# Patient Record
Sex: MALE | Race: WHITE | Employment: FULL TIME | URBAN - METROPOLITAN AREA
[De-identification: names, ages, dates, MRNs, and addresses within clinical notes are randomized per-mention and may not be internally consistent; named-entity substitution may affect disease eponyms.]

---

## 2023-11-13 ENCOUNTER — OFFICE VISIT (OUTPATIENT)
Dept: NEPHROLOGY | Facility: CLINIC | Age: 63
End: 2023-11-13
Payer: COMMERCIAL

## 2023-11-13 VITALS — WEIGHT: 162.25 LBS | SYSTOLIC BLOOD PRESSURE: 126 MMHG | DIASTOLIC BLOOD PRESSURE: 64 MMHG

## 2023-11-13 DIAGNOSIS — C76.0 HEAD AND NECK CANCER (HCC): ICD-10-CM

## 2023-11-13 DIAGNOSIS — I10 ESSENTIAL HYPERTENSION: ICD-10-CM

## 2023-11-13 DIAGNOSIS — N17.9 AKI (ACUTE KIDNEY INJURY) (HCC): Primary | ICD-10-CM

## 2023-11-13 RX ORDER — ATENOLOL 50 MG/1
50 TABLET ORAL DAILY
COMMUNITY

## 2023-11-13 RX ORDER — OMEPRAZOLE 20 MG/1
20 CAPSULE, DELAYED RELEASE ORAL 2 TIMES DAILY
COMMUNITY

## 2023-11-13 RX ORDER — AMLODIPINE BESYLATE 10 MG/1
10 TABLET ORAL DAILY
COMMUNITY

## 2023-11-13 NOTE — PROGRESS NOTES
Consult Note      REASON FOR CONSULT: Acute kidney injury         HPI:   Hiwot Bettencourt is a 61year old male with No chief complaint on file. No primary care provider on file. Mr. Wynona Sicard was seen in the nephrology clinic today in consultation for management of acute kidney injury based on blood work from September 11th and 14th. This is a 60-year-old man with somewhat recently diagnosed head and neck cancer who was undergoing chemotherapy beginning this past summer and was having some poor response which prompted his oncologist to increase his chemotherapy dosing. It appears that this did have a significant impact on his tumors. Reviewing his labs it is notable that his creatinine on 2 occasions in mid September (the 11th of 14) was mildly elevated at close to 1.75 mg/dL. However, since that time he had several blood draws which have shown essentially normal renal function. He has continued to lose weight and while he does have a feeding tube in he has not been adequately supplementing his nutrition with oral intake due to extreme lack of taste and flavor. ROS:    Denies fever/chills  + Wt loss  Denies HA or visual changes  Denies CP or palpitations  Denies SOB/cough/hemoptysis  Denies abd or flank pain  Denies N/V/D  Denies change in urinary habits or gross hematuria  Denies LE edema  Denies skin rashes/myalgias/arthralgias      PMH:  Hypertension  Head and neck cancer      PSH:  No past surgical history on file. Medications (Active prior to today's visit):  No current outpatient medications on file. Allergies:  Not on File    Social History:  Social History     Socioeconomic History    Marital status:           Family History:  No family history on file. PHYSICAL EXAM:   Wt 162 lb 4 oz (73.6 kg)    Wt Readings from Last 6 Encounters:   11/13/23 162 lb 4 oz (73.6 kg)     General: Alert and oriented in no apparent distress.   HEENT: No scleral icterus, MMM  Neck: Supple, no MIKE or thyromegaly  Cardiac: Regular rate and rhythm, S1, S2 normal, no murmur or rub  Lungs: Clear without wheezes, rales, rhonchi. Extremities: Without clubbing, cyanosis or edema. Neurologic:  normal affect, cranial nerves grossly intact, moving all extremities  Skin: Warm and dry, no rashes      LABS:     Labs reviewed from November 8 and include creatinine 1.03 mg/dL  ASSESSMENT/PLAN:     #1.  Acute kidney injury-Labs from September certainly demonstrate evidence of acute kidney injury with creatinine of 1.78 mg/dL at its highest.  Fortunately the renal function has improved significantly and the creatinine was most recently 1.03 mg/dL on November 8. I suspect his acute kidney injury was related to chemotherapy and may have been related to issues with prerenal azotemia from poor oral intake as well.,  Again he has essentially recovered and his renal function is now normal.  At this point he does not require specific follow-up in the nephrology clinic although I did discuss with he and his wife that I be happy to see him in the future should this be deemed necessary      #2. Hypertension-blood pressure is stable in the office today and he has been on amlodipine and atenolol for many years. He will continue his current antihypertensive regimen    #3.   Head and neck cancer-he is following closely with his oncologist            Ronald Crain MD  11/13/2023  2:34 PM

## 2024-01-20 ENCOUNTER — APPOINTMENT (OUTPATIENT)
Dept: CT IMAGING | Age: 64
DRG: 920 | End: 2024-01-20
Attending: EMERGENCY MEDICINE
Payer: COMMERCIAL

## 2024-01-20 ENCOUNTER — APPOINTMENT (OUTPATIENT)
Dept: CT IMAGING | Age: 64
End: 2024-01-20
Attending: EMERGENCY MEDICINE
Payer: COMMERCIAL

## 2024-01-20 ENCOUNTER — APPOINTMENT (OUTPATIENT)
Dept: GENERAL RADIOLOGY | Age: 64
DRG: 920 | End: 2024-01-20
Attending: PHYSICIAN ASSISTANT
Payer: COMMERCIAL

## 2024-01-20 ENCOUNTER — HOSPITAL ENCOUNTER (OUTPATIENT)
Facility: HOSPITAL | Age: 64
Setting detail: OBSERVATION
Discharge: HOME | DRG: 920 | End: 2024-01-23
Attending: EMERGENCY MEDICINE | Admitting: HOSPITALIST
Payer: COMMERCIAL

## 2024-01-20 ENCOUNTER — APPOINTMENT (OUTPATIENT)
Dept: GENERAL RADIOLOGY | Age: 64
End: 2024-01-20
Attending: PHYSICIAN ASSISTANT
Payer: COMMERCIAL

## 2024-01-20 ENCOUNTER — HOSPITAL ENCOUNTER (INPATIENT)
Facility: HOSPITAL | Age: 64
LOS: 3 days | Discharge: HOME | End: 2024-01-23
Attending: EMERGENCY MEDICINE | Admitting: HOSPITALIST
Payer: COMMERCIAL

## 2024-01-20 DIAGNOSIS — R93.89 ABNORMAL X-RAY OF NECK: ICD-10-CM

## 2024-01-20 DIAGNOSIS — E87.1 HYPONATREMIA: Primary | ICD-10-CM

## 2024-01-20 DIAGNOSIS — D64.9 ANEMIA, UNSPECIFIED TYPE: ICD-10-CM

## 2024-01-20 PROBLEM — D32.9 MENINGIOMA (HCC): Status: ACTIVE | Noted: 2024-01-20

## 2024-01-20 PROBLEM — J05.10 EPIGLOTTITIS: Status: ACTIVE | Noted: 2024-01-20

## 2024-01-20 LAB
ALBUMIN SERPL-MCNC: 3.6 G/DL (ref 3.4–5)
ALBUMIN/GLOB SERPL: 1.1 {RATIO} (ref 1–2)
ALP LIVER SERPL-CCNC: 73 U/L
ANION GAP SERPL CALC-SCNC: 4 MMOL/L (ref 0–18)
AST SERPL-CCNC: 18 U/L (ref 15–37)
BASOPHILS # BLD AUTO: 0.04 X10(3) UL (ref 0–0.2)
BASOPHILS NFR BLD AUTO: 0.8 %
BILIRUB SERPL-MCNC: 0.4 MG/DL (ref 0.1–2)
BILIRUB UR QL STRIP.AUTO: NEGATIVE
BUN BLD-MCNC: 15 MG/DL (ref 9–23)
CALCIUM BLD-MCNC: 9 MG/DL (ref 8.5–10.1)
CHLORIDE SERPL-SCNC: 86 MMOL/L (ref 98–112)
CLARITY UR REFRACT.AUTO: CLEAR
CO2 SERPL-SCNC: 30 MMOL/L (ref 21–32)
COLOR UR AUTO: YELLOW
CREAT BLD-MCNC: 0.82 MG/DL
DEPRECATED HBV CORE AB SER IA-ACNC: 297.6 NG/ML
EGFRCR SERPLBLD CKD-EPI 2021: 99 ML/MIN/1.73M2 (ref 60–?)
EOSINOPHIL # BLD AUTO: 0.23 X10(3) UL (ref 0–0.7)
EOSINOPHIL NFR BLD AUTO: 4.6 %
ERYTHROCYTE [DISTWIDTH] IN BLOOD BY AUTOMATED COUNT: 13.3 %
GLOBULIN PLAS-MCNC: 3.3 G/DL (ref 2.8–4.4)
GLUCOSE BLD-MCNC: 96 MG/DL (ref 70–99)
GLUCOSE UR STRIP.AUTO-MCNC: NEGATIVE MG/DL
HCT VFR BLD AUTO: 28.7 %
HGB BLD-MCNC: 9.7 G/DL
IMM GRANULOCYTES # BLD AUTO: 0.04 X10(3) UL (ref 0–1)
IMM GRANULOCYTES NFR BLD: 0.8 %
IRON SATN MFR SERPL: 14 %
IRON SERPL-MCNC: 47 UG/DL
KETONES UR STRIP.AUTO-MCNC: NEGATIVE MG/DL
LEUKOCYTE ESTERASE UR QL STRIP.AUTO: NEGATIVE
LYMPHOCYTES # BLD AUTO: 0.51 X10(3) UL (ref 1–4)
LYMPHOCYTES NFR BLD AUTO: 10.3 %
MCH RBC QN AUTO: 30 PG (ref 26–34)
MCHC RBC AUTO-ENTMCNC: 33.8 G/DL (ref 31–37)
MCV RBC AUTO: 88.9 FL
MONOCYTES # BLD AUTO: 0.49 X10(3) UL (ref 0.1–1)
MONOCYTES NFR BLD AUTO: 9.9 %
NEUTROPHILS # BLD AUTO: 3.66 X10 (3) UL (ref 1.5–7.7)
NEUTROPHILS # BLD AUTO: 3.66 X10(3) UL (ref 1.5–7.7)
NEUTROPHILS NFR BLD AUTO: 73.6 %
NITRITE UR QL STRIP.AUTO: NEGATIVE
OSMOLALITY SERPL CALC.SUM OF ELEC: 251 MOSM/KG (ref 275–295)
OSMOLALITY SERPL: 253 MOSM/KG (ref 280–300)
OSMOLALITY UR: 236 MOSM/KG (ref 300–1300)
OSMOLALITY UR: 253 MOSM/KG (ref 300–1300)
PH UR STRIP.AUTO: 7 [PH] (ref 5–8)
PLATELET # BLD AUTO: 241 10(3)UL (ref 150–450)
POCT INFLUENZA A: NEGATIVE
POCT INFLUENZA B: NEGATIVE
POTASSIUM SERPL-SCNC: 5 MMOL/L (ref 3.5–5.1)
PROT SERPL-MCNC: 6.9 G/DL (ref 6.4–8.2)
PROT UR STRIP.AUTO-MCNC: NEGATIVE MG/DL
RBC # BLD AUTO: 3.23 X10(6)UL
RBC UR QL AUTO: NEGATIVE
SARS-COV-2 RNA RESP QL NAA+PROBE: NOT DETECTED
SODIUM SERPL-SCNC: 120 MMOL/L (ref 136–145)
SODIUM SERPL-SCNC: 45 MMOL/L
SODIUM SERPL-SCNC: 47 MMOL/L
SP GR UR STRIP.AUTO: 1.01 (ref 1–1.03)
TIBC SERPL-MCNC: 347 UG/DL (ref 240–450)
TRANSFERRIN SERPL-MCNC: 233 MG/DL (ref 200–360)
TSI SER-ACNC: 0.82 MIU/ML (ref 0.36–3.74)
UROBILINOGEN UR STRIP.AUTO-MCNC: 0.2 MG/DL
WBC # BLD AUTO: 5 X10(3) UL (ref 4–11)

## 2024-01-20 PROCEDURE — 71046 X-RAY EXAM CHEST 2 VIEWS: CPT | Performed by: PHYSICIAN ASSISTANT

## 2024-01-20 PROCEDURE — 99223 1ST HOSP IP/OBS HIGH 75: CPT | Performed by: HOSPITALIST

## 2024-01-20 PROCEDURE — 70360 X-RAY EXAM OF NECK: CPT | Performed by: PHYSICIAN ASSISTANT

## 2024-01-20 PROCEDURE — 70491 CT SOFT TISSUE NECK W/DYE: CPT | Performed by: EMERGENCY MEDICINE

## 2024-01-20 RX ORDER — SODIUM CHLORIDE 9 MG/ML
INJECTION, SOLUTION INTRAVENOUS CONTINUOUS
Status: DISCONTINUED | OUTPATIENT
Start: 2024-01-20 | End: 2024-01-23

## 2024-01-20 RX ORDER — BENZONATATE 100 MG/1
200 CAPSULE ORAL 3 TIMES DAILY PRN
Status: DISCONTINUED | OUTPATIENT
Start: 2024-01-20 | End: 2024-01-23

## 2024-01-20 RX ORDER — AMLODIPINE BESYLATE 5 MG/1
10 TABLET ORAL DAILY
Status: DISCONTINUED | OUTPATIENT
Start: 2024-01-20 | End: 2024-01-23

## 2024-01-20 RX ORDER — PANTOPRAZOLE SODIUM 20 MG/1
20 TABLET, DELAYED RELEASE ORAL
Status: DISCONTINUED | OUTPATIENT
Start: 2024-01-20 | End: 2024-01-23

## 2024-01-20 RX ORDER — ONDANSETRON 2 MG/ML
4 INJECTION INTRAMUSCULAR; INTRAVENOUS EVERY 6 HOURS PRN
Status: DISCONTINUED | OUTPATIENT
Start: 2024-01-20 | End: 2024-01-23

## 2024-01-20 RX ORDER — PROCHLORPERAZINE EDISYLATE 5 MG/ML
5 INJECTION INTRAMUSCULAR; INTRAVENOUS EVERY 8 HOURS PRN
Status: DISCONTINUED | OUTPATIENT
Start: 2024-01-20 | End: 2024-01-23

## 2024-01-20 RX ORDER — MELATONIN
3 NIGHTLY PRN
Status: DISCONTINUED | OUTPATIENT
Start: 2024-01-20 | End: 2024-01-23

## 2024-01-20 RX ORDER — SENNOSIDES 8.6 MG
17.2 TABLET ORAL NIGHTLY PRN
Status: DISCONTINUED | OUTPATIENT
Start: 2024-01-20 | End: 2024-01-23

## 2024-01-20 RX ORDER — ATENOLOL 50 MG/1
50 TABLET ORAL DAILY
Status: DISCONTINUED | OUTPATIENT
Start: 2024-01-20 | End: 2024-01-23

## 2024-01-20 RX ORDER — ENOXAPARIN SODIUM 100 MG/ML
40 INJECTION SUBCUTANEOUS DAILY
Status: DISCONTINUED | OUTPATIENT
Start: 2024-01-20 | End: 2024-01-23

## 2024-01-20 RX ORDER — ENEMA 19; 7 G/133ML; G/133ML
1 ENEMA RECTAL ONCE AS NEEDED
Status: DISCONTINUED | OUTPATIENT
Start: 2024-01-20 | End: 2024-01-23

## 2024-01-20 RX ORDER — POLYETHYLENE GLYCOL 3350 17 G/17G
17 POWDER, FOR SOLUTION ORAL DAILY PRN
Status: DISCONTINUED | OUTPATIENT
Start: 2024-01-20 | End: 2024-01-23

## 2024-01-20 RX ORDER — ECHINACEA PURPUREA EXTRACT 125 MG
1 TABLET ORAL
Status: DISCONTINUED | OUTPATIENT
Start: 2024-01-20 | End: 2024-01-23

## 2024-01-20 RX ORDER — FOLIC ACID 1 MG/1
1 TABLET ORAL DAILY
Status: DISCONTINUED | OUTPATIENT
Start: 2024-01-20 | End: 2024-01-23

## 2024-01-20 RX ORDER — ONDANSETRON 4 MG/1
4 TABLET, ORALLY DISINTEGRATING ORAL EVERY 8 HOURS PRN
COMMUNITY

## 2024-01-20 RX ORDER — ACETAMINOPHEN 500 MG
1000 TABLET ORAL EVERY 4 HOURS PRN
Status: DISCONTINUED | OUTPATIENT
Start: 2024-01-20 | End: 2024-01-23

## 2024-01-20 RX ORDER — FOLIC ACID 1 MG/1
TABLET ORAL DAILY
COMMUNITY

## 2024-01-20 RX ORDER — SODIUM CHLORIDE 9 MG/ML
INJECTION, SOLUTION INTRAVENOUS ONCE
Status: COMPLETED | OUTPATIENT
Start: 2024-01-20 | End: 2024-01-20

## 2024-01-20 RX ORDER — BISACODYL 10 MG
10 SUPPOSITORY, RECTAL RECTAL
Status: DISCONTINUED | OUTPATIENT
Start: 2024-01-20 | End: 2024-01-23

## 2024-01-20 NOTE — CONSULTS
[facility] Consult Note    Reason for Consult: Head and Neck Cancer  Medical Record Number: [id number]   CSN: [id number]   Referring Physician: No ref. provider found  PCP: No primary care provider on file.    History of Present Illness: 63M with a PMH of RA on methotrexate, SCCa s/p chemoRT and salvage neck dissection, TRIP, GERD and HTN presented on 1/20/23 with a chronic cough that is worsening.     -He follows at [facility] for his cancer cancer.  Per notes, he was diagnosed in 05/2023. He is s/p induction cisplatin + 5-FU + Taxotere x 2 followed by chemoRT with cisplatin which was complicated by DIANNA, nausea. He s/p a salvage neck dissection on 12/11/23 with 1 mm focus of SCCa in one LN. 8 other LN negative.     -Presented to [facility] with acute on chronic cough on 1/20/23. CXR with peribronchial thickening and mild hyper-inflation. CT neck did not show recurrence. There is concern for a post-operative seroma vs. Infection. Possible meningioma in left temporal region. HE reports a history of heavy chemical exposure.      Review of Systems: 12 Point ROS was completed and pertinent positives are in the HPI    Medications:    Current Facility-Administered Medications:     amLODIPine (Norvasc) tab 10 mg, 10 mg, Oral, Daily    atenolol (Tenormin) tab 50 mg, 50 mg, Oral, Daily    folic acid (Folvite) tab 1 mg, 1 mg, Oral, Daily    pantoprazole (Protonix) DR tab 20 mg, 20 mg, Oral, BID AC    acetaminophen (Tylenol Extra Strength) tab 1,000 mg, 1,000 mg, Oral, Q4H PRN    melatonin tab 3 mg, 3 mg, Oral, Nightly PRN    polyethylene glycol (PEG 3350) (Miralax) 17 g oral packet 17 g, 17 g, Oral, Daily PRN    sennosides (Senokot) tab 17.2 mg, 17.2 mg, Oral, Nightly PRN    bisacodyl (Dulcolax) 10 MG rectal suppository 10 mg, 10 mg, Rectal, Daily PRN    fleet enema (Fleet) 7-19 GM/118ML rectal enema 133 mL, 1 enema, Rectal, Once PRN    benzonatate (Tessalon) cap 200 mg, 200 mg, Oral, TID PRN    sodium  chloride 0.9% infusion, , Intravenous, Continuous    enoxaparin (Lovenox) 40 MG/0.4ML SUBQ injection 40 mg, 40 mg, Subcutaneous, Daily    ondansetron (Zofran) 4 MG/2ML injection 4 mg, 4 mg, Intravenous, Q6H PRN    prochlorperazine (Compazine) 10 MG/2ML injection 5 mg, 5 mg, Intravenous, Q8H PRN    guaiFENesin (Robitussin) 100 MG/5 ML oral liquid 200 mg, 200 mg, Oral, Q4H PRN    sodium chloride (Saline Mist) 0.65 % nasal solution 1 spray, 1 spray, Each Nare, Q3H PRN    glycerin-hypromellose- (Artifical Tears) 0.2-0.2-1 % ophthalmic solution 1 drop, 1 drop, Both Eyes, QID PRN    piperacillin-tazobactam (Zosyn) 3.375 g in dextrose 5% 100 mL IVPB-ADDV, 3.375 g, Intravenous, Q8H    Past Medical History:   Diagnosis Date    Esophageal reflux     Essential hypertension     Throat cancer (HCC)      Past Surgical History:   Procedure Laterality Date    FOOT JOINT ARTHORCENTESIS Right     REPAIR ING HERNIA,5+Y/O,REDUCIBL      THROAT SURGERY PROCEDURE UNLISTED       Social History     Socioeconomic History    Marital status:    Tobacco Use    Smoking status: Former     Types: Cigarettes    Smokeless tobacco: Never   Vaping Use    Vaping Use: Never used   Substance and Sexual Activity    Alcohol use: Not Currently    Drug use: Never      No family history on file.    Physical Exam  /88 (BP Location: Left arm)   Pulse 69   Temp 98.1 °F (36.7 °C)   Resp 18   Ht 1.727 m (5' 8\")   Wt 69.9 kg (154 lb 1.6 oz)   SpO2 98%   BMI 23.43 kg/m²      General: NAD, AOX3  HEENT: clear op, mmm, no jvd. EOM intact, no scleral icterus   CV: RRR S1S2 no murmurs  Extremities: No edema  Lungs: CTAB, no increased work of breathing  Abd: soft nt nd +BS no hepatosplenomegaly  Neuro: CN: II-XII grossly intact  Psych: Normal Mood and affect     Results:  Lab Results   Component Value Date    WBC 5.0 01/20/2024    HGB 9.7 (L) 01/20/2024    HCT 28.7 (L) 01/20/2024    MCV 88.9 01/20/2024    .0 01/20/2024     Lab Results    Component Value Date     (LL) 01/20/2024    K 5.0 01/20/2024    CO2 30.0 01/20/2024    CL 86 (L) 01/20/2024    BUN 15 01/20/2024    ALB 3.6 01/20/2024       Radiology: reviewed     Assessment and Plan:  Squamous Cell Carcinoma of Neck: followed at [facility].  Per notes, he was diagnosed in 05/2023. He is s/p induction cisplatin + 5-FU + Taxotere x 2 followed by chemoRT with cisplatin which was complicated by DIANNA, nausea. He s/p a salvage neck dissection on 12/11/23 with 1 mm focus of SCCa in one LN. 8 other LN negative. Currently SINDHU. Outpatient PET/CT 12 weeks post-surgery is already scheduled.     Anemia: likely related to treatment, surgery and methotrexate. IV Venofer 200 mg x 3 ordered.     Hyponatremia: Nephrology following.    Possible Left Temporal meningioma: outpatient f/u with PCP for MRI.      RA: on methotrexate.     Cough: history of chemical exposure. Per primary     Oncology will sign off.     [facility]

## 2024-01-20 NOTE — PROGRESS NOTES
Nephrology Note    Pt with squamous cell CA s/p chemo + XRT + recent resection of mass / LN presents with SOB / cough and noted to be hyponatremic. Will eval urine studies / TSH; agree with IVF (NS) + fluid restriction. Will see in AM.    Ezra Monk MD  1/20/2024  543 PM

## 2024-01-20 NOTE — H&P
[facility]     Jose Angel Calderon III Patient Status:  Inpatient    3/27/1960 MRN [id number]   Location [facility] 1NE-A Attending Babar Escobra MD   Hosp Day # 0 PCP No primary care provider on file.     Chief Complaint: cough    Subjective:    History of Present Illness:     Jose Angel Calderon III is a 63 year old male with PMH squamous cell carcinoma of base of tongue s/p chemo/xrt, TRIP, GERD, HTN who p/w sob/cough. Has had chronic cough since chemo started. Completed chemo/xrt in November. Had surgery in December to remove mass and 9 lymph nodes (1 of which was cancer positive). Past week, has worsened. Now w/ SOB. Has a feeling of something in the back of his throat causing the cough. Denies f/c, cp, abd pain, n/v. Drinks about 2.5 bottles of water a day (1500cc) and does [employer] tube feed boluses 3x daily flushed w/ 250cc of free water. Has a hx of hyponatremia.     History/Other:    Past Medical History:  Past Medical History:   Diagnosis Date    Esophageal reflux     Essential hypertension     Throat cancer (HCC)      Past Surgical History:   Past Surgical History:   Procedure Laterality Date    FOOT JOINT ARTHORCENTESIS Right     REPAIR ING HERNIA,5+Y/O,REDUCIBL      THROAT SURGERY PROCEDURE UNLISTED        Family History:   No family history on file.  Social History:    reports that he has quit smoking. His smoking use included cigarettes. He has never used smokeless tobacco. He reports that he does not currently use alcohol. He reports that he does not use drugs.     Allergies: No Known Allergies    Medications:    No current facility-administered medications on file prior to encounter.     Current Outpatient Medications on File Prior to Encounter   Medication Sig Dispense Refill    Methotrexate Sodium 5 MG Oral Tab Take 0.5 tablets (2.5 mg total) by mouth 3 (three) times a week.      folic acid 1 MG Oral Tab Take by mouth daily.      ondansetron 4 MG Oral Tablet  Dispersible Take 1 tablet (4 mg total) by mouth every 8 (eight) hours as needed for Nausea.      amLODIPine 10 MG Oral Tab Take 1 tablet (10 mg total) by mouth daily.      atenolol 50 MG Oral Tab Take 1 tablet (50 mg total) by mouth daily.      omeprazole 20 MG Oral Capsule Delayed Release Take 1 capsule (20 mg total) by mouth in the morning and 1 capsule (20 mg total) before bedtime.         Review of Systems:   A comprehensive review of systems was completed.    Pertinent positives and negatives noted in the HPI.    Objective:   Physical Exam:    /88 (BP Location: Left arm)   Pulse 69   Temp 98.1 °F (36.7 °C)   Resp 18   Ht 5' 8\" (1.727 m)   Wt 154 lb 1.6 oz (69.9 kg)   SpO2 98%   BMI 23.43 kg/m²   General: No acute distress, Alert  Respiratory: No rhonchi, no wheezes  Cardiovascular: S1, S2. Regular rate and rhythm  Abdomen: Soft, Non-tender, non-distended, positive bowel sounds  Neuro: No new focal deficits  Extremities: No edema      Results:    Labs:      Labs Last 24 Hours:    Recent Labs   Lab 01/20/24  1244   RBC 3.23*   HGB 9.7*   HCT 28.7*   MCV 88.9   MCH 30.0   MCHC 33.8   RDW 13.3   NEPRELIM 3.66   WBC 5.0   .0       Recent Labs   Lab 01/20/24  1244   GLU 96   BUN 15   CREATSERUM 0.82   EGFRCR 99   CA 9.0   ALB 3.6   *   K 5.0   CL 86*   CO2 30.0   ALKPHO 73   AST 18   BILT 0.4   TP 6.9       No results found for: \"PT\", \"INR\"    No results for input(s): \"TROP\", \"TROPHS\", \"CK\" in the last 168 hours.    No results for input(s): \"TROP\", \"PBNP\" in the last 168 hours.    No results for input(s): \"PCT\" in the last 168 hours.    Imaging: Imaging data reviewed in Epic.    Assessment & Plan:      #post op seroma/fluid collection  #epiglottitis  -possible infection? Seems less likely   -ENT consult  -empiric IV abx, cxs  -IVF    #squamous cell carcinoma of base of tongue  -onc  -just completed chemo/xrt. S/p recent surgical resection of mass    #hyponatremia, suspect 2/2 excess free  water intake  -IVF  -nephro consult  -urine studies  -fluid restriction    #dietary  -consult nutrition for tube feeds    #incidental meningioma  -outpt MRI        Plan of care discussed with pt, ed physician     Babar Escobar MD    Supplementary Documentation:     The 21st Century Cures Act makes medical notes like these available to patients in the interest of transparency. Please be advised this is a medical document. Medical documents are intended to carry relevant information, facts as evident, and the clinical opinion of the practitioner. The medical note is intended as peer to peer communication and may appear blunt or direct. It is written in medical language and may contain abbreviations or verbiage that are unfamiliar.

## 2024-01-20 NOTE — ED QUICK NOTES
Orders for admission, patient is aware of plan and ready to go upstairs. Any questions, please call ED HERMINIO Person  at extension [phone].     Vaccinated? yes  Type of COVID test sent: n/a  COVID Suspicion level: Low      Titratable drug(s) infusing:n/a  Rate: NS 125cc/hr    LOC at time of transport: alert    Other pertinent information: n/a    CIWA score= n/a  NIH score=  n/a

## 2024-01-20 NOTE — ED INITIAL ASSESSMENT (HPI)
Coughing more than normal the past week- began having diff breathing over the last couple of days- recently finished chemo and radiation for throat ca

## 2024-01-20 NOTE — ED PROVIDER NOTES
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  He has a constant globulus sensation to his throat that he feels is triggering the cough.  He denies any fever or chills.  No headache or malaise.  No chest pain or shortness of breath.    Objective:   Past Medical History:   Diagnosis Date    Esophageal reflux     Essential hypertension     Throat cancer (HCC)               Past Surgical History:   Procedure Laterality Date    FOOT JOINT ARTHORCENTESIS Right     REPAIR ING HERNIA,5+Y/O,REDUCIBL      THROAT SURGERY PROCEDURE UNLISTED                  Social History     Socioeconomic History    Marital status:    Tobacco Use    Smoking status: Former     Types: Cigarettes    Smokeless tobacco: Never   Vaping Use    Vaping Use: Never used   Substance and Sexual Activity    Alcohol use: Not Currently    Drug use: Never              Review of Systems    Positive for stated complaint: SOB/cough  Other systems are as noted in HPI.  Constitutional and vital signs reviewed.      All other systems reviewed and negative except as noted above.    Physical Exam     ED Triage Vitals [01/20/24 1205]   /85   Pulse 57   Resp 18   Temp 98.1 °F (36.7 °C)   Temp src Oral   SpO2 100 %   O2 Device None (Room air)       Current:BP (!) 149/96   Pulse 62   Temp 98.1 °F (36.7 °C) (Oral)   Resp 18   Ht 172.7 cm (5' 8\")   Wt 70.3 kg   SpO2 98%   BMI 23.57 kg/m²         Physical Exam    Gen: Well appearing, well groomed, alert and aware x 3  Neck: Supple, full range of motion, no thyromegaly or lymphadenopathy.  Eye  examination: EOMs are intact, normal conjunctival  ENT: No injection noted to the bilateral auditory canals; no loss of landmarks. Normal nasal mucosa without audible nasal congestion.  Oropharynx is patent without evidence of erythema, exudates or deviation.  No stridor to auscultation  Lung: No distress, RR, no retraction, breath sounds are clear bilaterally  Cardio: Regular rate and rhythm, normal S1-S2, no murmur appreciable  Skin: No sign of trauma, Skin warm and dry, no induration or sign of infection.  No rash noted      ED Course     Labs Reviewed   COMP METABOLIC PANEL (14) - Abnormal; Notable for the following components:       Result Value    Sodium 120 (*)     Chloride 86 (*)     Calculated Osmolality 251 (*)     All other components within normal limits   CBC W/ DIFFERENTIAL - Abnormal; Notable for the following components:    RBC 3.23 (*)     HGB 9.7 (*)     HCT 28.7 (*)     Lymphocyte Absolute 0.51 (*)     All other components within normal limits   RAPID SARS-COV-2 BY PCR - Normal   POCT FLU TEST - Normal    Narrative:     This assay is a rapid molecular in vitro test utilizing nucleic acid amplification of influenza A and B viral RNA.   CBC WITH DIFFERENTIAL WITH PLATELET    Narrative:     The following orders were created for panel order CBC With Differential With Platelet.  Procedure                               Abnormality         Status                     ---------                               -----------         ------                     CBC W/ DIFFERENTIAL[[id number]]          Abnormal            Final result                 Please view results for these tests on the individual orders.   URINALYSIS, ROUTINE   OSMOLALITY, URINE   OSMOLALITY, SERUM   SODIUM, URINE, RANDOM          CT SOFT TISSUE OF NECK(CONTRAST ONLY) (CPT=70491)    Result Date: 1/20/2024  PROCEDURE:  CT SOFT TISSUE OF NECK(CONTRAST ONLY) (CPT=70491)  COMPARISON:  None.  INDICATIONS:  SOB/cough  TECHNIQUE:  IV  contrast-enhanced multislice CT scanning is performed through the neck soft tissues during administration of nonionic contrast.  Dose reduction techniques were used. Dose information is transmitted to the ACR (American College of Radiology) NRDR (National Radiology Data Registry) which includes the Dose Index Registry.  PATIENT STATED HISTORY:(As transcribed by Technologist)  Coughing more than normal the past week- began having diff breathing over the last couple of days- recently finished chemo and radiation for throat ca.    CONTRAST USED:  50cc of Isovue 370  FINDINGS: NASOPHARYNX:  Fossae of Rosenmuller and torus tubarius are symmetric.  ORAL CAVITY:  No visible mass.  OROPHARYNX:  Faucial and lingual tonsils are symmetric.  HYPOPHARYNX:  No mass or other visible lesion.  LARYNX:  The vocal cords are symmetric and without mass.  SINUSES:  Limited views show no significant fluid or mucosal thickening.  NECK GLANDS:  The parotid, submandibular, and thyroid glands are unremarkable.  LYMPH NODES:  No pathological-appearing or enlarged lymph nodes.  SKULL BASE:  Foramina are symmetric without bony erosion.  VASCULATURE:  Mild calcified plaque at the carotid bifurcations.   The thoracic aorta is partially imaged.  Tortuosity and ectasia.  3.8 cm diameter of the ascending segment.  Corresponding descending diameter 2.8 cm. BONES:  Moderate to severe degenerative changes in the cervical spine. OTHER:  There is an elongated low attenuation focus in the left neck, posterior-lateral to the carotid-jugular sheath and posterior to the sternocleido mastoid muscle.  It is measured 2.7 x 2.0 x 5.2 cm.  Hounsfield units consistent with fluid.  The inferior portion is adjacent to surgical clips.  No gas or enhancement.   There is partial imaging of an extra-axial mass in the left temporal region measuring 2.4 x 1.1 cm.   Scattered nonenlarged lymph nodes are seen in the visualized mediastinum.   There is diffuse stranding in the  neck, left greater than right.            CONCLUSION:  1. Given the history of head and neck cancer, there is no discrete mass to reflect primary neoplasm. 2. There is an elongated fluid collection in the left neck.  It is adjacent to surgical clips.  A postoperative seroma is a primary consideration.  The potential for infection should be correlated with clinical suspicion.  There are no additional imaging  findings suggesting an infected collection. 3. There is partial imaging of an extra-axial mass in the left temporal region.  This is most likely a meningioma.  A follow-up outpatient elective MRI with contrast is recommended for complete assessment. 4. Scattered nonenlarged lymph nodes in the visualized mediastinum. 5. There is diffuse stranding in the neck, left greater than right.  This may be related to the treatment as there is a given history of radiation. 6. Details as above.  Continued clinical correlation recommended.    LOCATION:  [facility]    Dictated by (CST): Ramiro Urrutia MD on 1/20/2024 at 2:12 PM     Finalized by (CST): Ramiro Urrutia MD on 1/20/2024 at 2:20 PM       XR CHEST PA + LAT CHEST (CPT=71046)    Result Date: 1/20/2024  PROCEDURE:  XR CHEST PA + LAT CHEST (CPT=71046)  INDICATIONS:  SOB/cough  COMPARISON:  None.  TECHNIQUE:  PA and lateral chest radiographs were obtained.  PATIENT STATED HISTORY: (As transcribed by Technologist)  Cough and shortness of breath for 2 weeks    FINDINGS:  Normal heart size and pulmonary vascularity. No pleural effusion or pneumothorax. No lobar consolidation.  Mildly tortuous thoracic aorta.  Degenerative changes the spine.  Surgical clips in the left neck.  Minimal atelectasis/scarring in the lower lungs.  Peribronchial thickening with mild hyperinflation could be related to bronchitis and/or asthma. Clinical correlation recommended.            CONCLUSION:  Peribronchial thickening with mild hyperinflation could be related to bronchitis and/or asthma. Clinical  correlation recommended. Minimal atelectasis/scarring in the lower lungs.   LOCATION:  [facility]   Dictated by (CST): Jai Bo MD on 1/20/2024 at 12:50 PM     Finalized by (CST): Jai Bo MD on 1/20/2024 at 12:51 PM       XR SOFT TISSUE NECK (CPT=70360)    Result Date: 1/20/2024  PROCEDURE:  XR SOFT TISSUE NECK (CPT=70360)  COMPARISON:  None.  INDICATIONS:  SOB/cough  PATIENT STATED HISTORY: (As transcribed by Technologist)  Cough and shortness of breath for 2 weeks. pt also has a history of throat ca    FINDINGS:  There is epiglottic thickening and subglottic narrowing with abnormal soft tissue opacification of the region of the larynx and hypopharynx.  Adenoid soft tissue thickening and thickening of the soft palate are also noted. The overall findings  could be due to the patient's known history of head neck cancer, with underlying infection or other etiologies not entirely excluded.  CT of the soft tissues of the neck with contrast is suggested for further evaluation.  Scattered surgical clips in the  neck.  Degenerative changes the spine.  Scattered vascular calcifications.             CONCLUSION:  There is epiglottic thickening and subglottic narrowing with abnormal soft tissue opacification of the region of the larynx and hypopharynx.  Adenoid soft tissue thickening and thickening of the soft palate are also noted. The overall findings could be due to the patient's known history of head neck cancer, with underlying infection or other etiologies not entirely excluded.  CT of the soft tissues of the neck with contrast is suggested for further evaluation.    LOCATION:  [facility]    Dictated by (CST): Jai Bo MD on 1/20/2024 at 12:48 PM     Finalized by (CST): Jai Bo MD on 1/20/2024 at 12:50 PM             MDM      Afebrile.  No tachycardia.  100% room air.  Patient speaking in room.  Feels that he has a constant worsened globulus sensation to the mouth and throat this past 7 to 10  days.    COVID swab.  Basic labs.  Chest x-ray and soft tissue neck.  Admission disposition: 1/20/2024  2:56 PM         CONCLUSION:  Peribronchial thickening with mild hyperinflation could be related to bronchitis and/or asthma. Clinical correlation recommended. Minimal atelectasis/scarring in the lower lungs.   LOCATION:  [facility]   Dictated by (CST): Jai Bo MD on 1/20/2024 at 12:50 PM     Finalized by (CST): Jai Bo MD on 1/20/2024 at 12:51 PM       CONCLUSION:  There is epiglottic thickening and subglottic narrowing with abnormal soft tissue opacification of the region of the larynx and hypopharynx.  Adenoid soft tissue thickening and thickening of the soft palate are also noted. The overall findings could be due to the patient's known history of head neck cancer, with underlying infection or other etiologies not entirely excluded.  CT of the soft tissues of the neck with contrast is suggested for further evaluation.    LOCATION:  [facility]    Dictated by (CST): Jai Bo MD on 1/20/2024 at 12:48 PM     Finalized by (CST): Jai Bo MD on 1/20/2024 at 12:50 PM        CONCLUSION:  1. Given the history of head and neck cancer, there is no discrete mass to reflect primary neoplasm. 2. There is an elongated fluid collection in the left neck.  It is adjacent to surgical clips.  A postoperative seroma is a primary consideration.  The potential for infection should be correlated with clinical suspicion.  There are no additional imaging  findings suggesting an infected collection. 3. There is partial imaging of an extra-axial mass in the left temporal region.  This is most likely a meningioma.  A follow-up outpatient elective MRI with contrast is recommended for complete assessment. 4. Scattered nonenlarged lymph nodes in the visualized mediastinum. 5. There is diffuse stranding in the neck, left greater than right.  This may be related to the treatment as there is a given history of radiation.  6. Details as above.  Continued clinical correlation recommended.    LOCATION:  [facility]    Dictated by (CST): Ramiro Urrutia MD on 1/20/2024 at 2:12 PM     Finalized by (CST): Ramiro Urrutia MD on 1/20/2024 at 2:20 PM           Imaging as above.  CT soft tissue neck with IV contrast ordered          CBC demonstrates no leukocytosis.  Hemoglobin 9.7    CMP demonstrates a sodium of 120, chloride 86.  Critical result called to nursing staff.  Labs were not completed until 1 hour 47 minutes after arrival.     Began slow fluids, 0.9@100 mL an hour    Patient sent for CT.  Will plan on admission     Case discussed with both hospitalist and nephrology, ENT    Medical Decision Making      Disposition and Plan     Clinical Impression:  1. Hyponatremia    2. Abnormal x-ray of neck         Disposition:  Admit  1/20/2024  2:56 pm    Follow-up:  No follow-up provider specified.        Medications Prescribed:  Current Discharge Medication List                            Hospital Problems       Present on Admission  Date Reviewed: 11/13/2023            ICD-10-CM Noted POA    * (Principal) Hyponatremia E87.1 1/20/2024 Unknown

## 2024-01-21 PROBLEM — E22.2 SIADH (SYNDROME OF INAPPROPRIATE ADH PRODUCTION) (HCC): Status: ACTIVE | Noted: 2024-01-21

## 2024-01-21 PROBLEM — C76.0 HEAD AND NECK CANCER (HCC): Status: ACTIVE | Noted: 2024-01-21

## 2024-01-21 LAB
ALBUMIN SERPL-MCNC: 3.4 G/DL (ref 3.4–5)
ALBUMIN/GLOB SERPL: 1.3 {RATIO} (ref 1–2)
ALP LIVER SERPL-CCNC: 61 U/L
ANION GAP SERPL CALC-SCNC: 7 MMOL/L (ref 0–18)
AST SERPL-CCNC: 7 U/L (ref 15–37)
BASOPHILS # BLD AUTO: 0.05 X10(3) UL (ref 0–0.2)
BASOPHILS NFR BLD AUTO: 1.3 %
BILIRUB SERPL-MCNC: 0.4 MG/DL (ref 0.1–2)
BUN BLD-MCNC: 10 MG/DL (ref 9–23)
C DIFF TOX B STL QL: NEGATIVE
CALCIUM BLD-MCNC: 8.7 MG/DL (ref 8.5–10.1)
CHLORIDE SERPL-SCNC: 91 MMOL/L (ref 98–112)
CO2 SERPL-SCNC: 27 MMOL/L (ref 21–32)
CREAT BLD-MCNC: 0.74 MG/DL
EGFRCR SERPLBLD CKD-EPI 2021: 102 ML/MIN/1.73M2 (ref 60–?)
EOSINOPHIL # BLD AUTO: 0.26 X10(3) UL (ref 0–0.7)
EOSINOPHIL NFR BLD AUTO: 6.7 %
ERYTHROCYTE [DISTWIDTH] IN BLOOD BY AUTOMATED COUNT: 13.2 %
GLOBULIN PLAS-MCNC: 2.7 G/DL (ref 2.8–4.4)
GLUCOSE BLD-MCNC: 138 MG/DL (ref 70–99)
GLUCOSE BLD-MCNC: 92 MG/DL (ref 70–99)
GLUCOSE BLD-MCNC: 97 MG/DL (ref 70–99)
HCT VFR BLD AUTO: 26.1 %
HGB BLD-MCNC: 9.1 G/DL
IMM GRANULOCYTES # BLD AUTO: 0.02 X10(3) UL (ref 0–1)
IMM GRANULOCYTES NFR BLD: 0.5 %
LYMPHOCYTES # BLD AUTO: 0.39 X10(3) UL (ref 1–4)
LYMPHOCYTES NFR BLD AUTO: 10.1 %
MAGNESIUM SERPL-MCNC: 1.9 MG/DL (ref 1.6–2.6)
MCH RBC QN AUTO: 30 PG (ref 26–34)
MCHC RBC AUTO-ENTMCNC: 34.9 G/DL (ref 31–37)
MCV RBC AUTO: 86.1 FL
MONOCYTES # BLD AUTO: 0.35 X10(3) UL (ref 0.1–1)
MONOCYTES NFR BLD AUTO: 9 %
NEUTROPHILS # BLD AUTO: 2.81 X10 (3) UL (ref 1.5–7.7)
NEUTROPHILS # BLD AUTO: 2.81 X10(3) UL (ref 1.5–7.7)
NEUTROPHILS NFR BLD AUTO: 72.4 %
OSMOLALITY SERPL CALC.SUM OF ELEC: 259 MOSM/KG (ref 275–295)
PHOSPHATE SERPL-MCNC: 3.3 MG/DL (ref 2.5–4.9)
PLATELET # BLD AUTO: 214 10(3)UL (ref 150–450)
POTASSIUM SERPL-SCNC: 4.3 MMOL/L (ref 3.5–5.1)
PROT SERPL-MCNC: 6.1 G/DL (ref 6.4–8.2)
RBC # BLD AUTO: 3.03 X10(6)UL
SODIUM SERPL-SCNC: 125 MMOL/L (ref 136–145)
WBC # BLD AUTO: 3.9 X10(3) UL (ref 4–11)

## 2024-01-21 PROCEDURE — 99232 SBSQ HOSP IP/OBS MODERATE 35: CPT | Performed by: HOSPITALIST

## 2024-01-21 PROCEDURE — 99255 IP/OBS CONSLTJ NEW/EST HI 80: CPT | Performed by: INTERNAL MEDICINE

## 2024-01-21 PROCEDURE — 99254 IP/OBS CNSLTJ NEW/EST MOD 60: CPT | Performed by: INTERNAL MEDICINE

## 2024-01-21 PROCEDURE — 3E0G76Z INTRODUCTION OF NUTRITIONAL SUBSTANCE INTO UPPER GI, VIA NATURAL OR ARTIFICIAL OPENING: ICD-10-PCS | Performed by: HOSPITALIST

## 2024-01-21 RX ORDER — SODIUM CHLORIDE 1 G/1
1 TABLET ORAL
Status: DISCONTINUED | OUTPATIENT
Start: 2024-01-21 | End: 2024-01-23

## 2024-01-21 RX ORDER — SODIUM BICARBONATE 325 MG/1
325 TABLET ORAL AS NEEDED
Status: DISCONTINUED | OUTPATIENT
Start: 2024-01-21 | End: 2024-01-23

## 2024-01-21 NOTE — PHYSICAL THERAPY NOTE
PHYSICAL THERAPY EVALUATION - INPATIENT     Room Number: 429/429-A  Evaluation Date: 2024  Type of Evaluation: Initial  Physician Order: PT Eval and Treat    Presenting Problem: hyponatremia  Co-Morbidities : h/o Squamous cell CA of neck  Reason for Therapy: Mobility Dysfunction and Discharge Planning      ASSESSMENT   Pt is a 63 year old male admitted on 2024 for hyponatremia. Functional outcome measures completed include AMPAC.  The AM-PAC '6-Clicks' Inpatient Basic Mobility Short Form was completed and this patient is demonstrating a Approx Degree of Impairment: 0%  degree of impairment in mobility. Research supports that patients with this level of impairment may benefit from return to home.  PT Discharge Recommendations: Home      PLAN  Patient has been evaluated and presents with no skilled Physical Therapy needs at this time.  Patient discharged from Physical Therapy services.  Please re-order if a new functional limitation presents during this admission.    GOALS  Patient was able to achieve the following goals ...    Patient was able to transfer Safely and independently   Patient able to ambulate on level surfaces Safely and independently         HOME SITUATION  Type of Home: House   Home Layout: Multi-level                Lives With: Spouse             Prior Level of [place]: Pt reports ind PTA.  Pt recently returned to work full time.      SUBJECTIVE  \"You don't want to head to the [employer] shop?\"      OBJECTIVE  Precautions: NG suction  Fall Risk: Standard fall risk    WEIGHT BEARING RESTRICTION                   PAIN ASSESSMENT  Ratin          COGNITION  Overall Cognitive Status:  WFL - within functional limits    RANGE OF MOTION AND STRENGTH ASSESSMENT  Upper extremity ROM and strength are within functional limits     Lower extremity ROM is within functional limits     Lower extremity strength is within functional limits       BALANCE  Static Sitting: Good  Dynamic Sitting:  Good  Static Standing: Good  Dynamic Standing: Good    ADDITIONAL TESTS                                    ACTIVITY TOLERANCE                         O2 WALK       NEUROLOGICAL FINDINGS                        AM-PAC '6-Clicks' INPATIENT SHORT FORM - BASIC MOBILITY  How much difficulty does the patient currently have...  Patient Difficulty: Turning over in bed (including adjusting bedclothes, sheets and blankets)?: None   Patient Difficulty: Sitting down on and standing up from a chair with arms (e.g., wheelchair, bedside commode, etc.): None   Patient Difficulty: Moving from lying on back to sitting on the side of the bed?: None   How much help from another person does the patient currently need...   Help from Another: Moving to and from a bed to a chair (including a wheelchair)?: None   Help from Another: Need to walk in hospital room?: None   Help from Another: Climbing 3-5 steps with a railing?: None       AM-PAC Score:  Raw Score: 24   Approx Degree of Impairment: 0%   Standardized Score (AM-PAC Scale): 61.14   CMS Modifier (G-Code): CH    FUNCTIONAL ABILITY STATUS  Gait Assessment   Functional Mobility/Gait Assessment  Gait Assistance: Independent  Distance (ft): 450  Assistive Device: None  Pattern: Within Functional Limits    Skilled Therapy Provided     Bed Mobility:  Rolling: ind  Supine to sit: ind   Sit to supine: ind     Transfer Mobility:  Sit to stand: ind   Stand to sit: ind  Gait = ind    Therapist's comments:Pt encouraged to continue to ambulate while in house.          Patient End of Session: Up in chair;Needs met;Call light within reach;RN aware of session/findings;All patient questions and concerns addressed;Discussed recommendations with /    Patient Evaluation Complexity Level:  History Moderate - 1 or 2 personal factors and/or co-morbidities   Examination of body systems Low - addressing 1-2 elements   Clinical Presentation Low - Stable   Clinical Decision Making Low  Complexity       PT Session Time: 10 minutes

## 2024-01-21 NOTE — PLAN OF CARE
Assumed pt care at approximately 1630. Admission navigator completed, patient oriented to room, belongings at bedside. Plan of care updated with patient and wife at bedside.

## 2024-01-21 NOTE — OCCUPATIONAL THERAPY NOTE
OT orders recieved, case discussed with evaluating PT. Pt presents with no skilled occupational therapy needs at this time. Orders completed. Please re-order if new functional limitation presents.

## 2024-01-21 NOTE — DIETARY MALNUTRITION NOTE
[facility]  NUTRITION ASSESSMENT    Pt meets moderate malnutrition criteria at this time.    CRITERIA FOR MALNUTRITION DIAGNOSIS:  Criteria for non-severe malnutrition diagnosis: chronic illness related to wt loss 10% in 6 months and muscle mass mild depletion    NUTRITION INTERVENTION:    RD nutrition Care Plan- See RD nutrition assessment for additional recommendations  Meal and Snacks - ADAT to Regular; monitor patient po intake. Encourage adequate po of appropriate diet.  Enteral Nutrition - Via G-tube, recommend bolus TF: Tavern Standard 1.4 - 2 bottles (650 ml) BID at 0800 and 1200 (or per pt preference).   This will provide 1820 kcal, 80 grams protein, 936 ml total free water, and 100% of RDI's.   Recommend 30 ml water flush before/after each bolus, TF+FWF provides 1056 ml total fluids.   Okay to continue home bolus regimen upon discharge.    PATIENT STATUS: 63 year old male admitted on 1/20 presents with SOB/cough. Pt screened d/t MST score 2. Visited pt at bedside. Pt reports decreased appetite/PO intake mostly related to tongue CA, chemo/RT, and taste changes (bland). Reports he had G-tube placed in Oct/Nov last year as he was continuing to lose wt and wasn't able to eat enough. He reports his current regimen is 4 bottles of Tavern 1.4 daily (2 in AM and 2 at lunch; provides ~1820 kcal, 80 gm pro, 936 ml free water) and then eats a PO dinner. Denies GI symptoms at this time but does report taking reglan BID because he was having some intolerance to bolus TF which has resolved since starting reglan. Reports giving bolus over ~20 min. Did discuss he could lengthen the time if needed. He does report following up with RD at [facility] who has been managing his TF regimen. Pt reports his wt last May was ~185-190 lbs and currently weighs 154 lbs. Noted pt admitted with hyponatremia. He reports drinking water throughout the day as well as giving FWF via G-tube which is likely contributing.  Nephrology following and recommending salt tabs; pt did report taking 1-2 tsp salt daily at home.    PMH: squamous cell carcinoma of base of tongue s/p chemo/xrt, TRIP, GERD, HTN     ANTHROPOMETRICS:  Ht: 172.7 cm (5' 8\")  Wt: 69.9 kg (154 lb 1.6 oz).   BMI: Body mass index is 23.43 kg/m².  IBW: 70 kg    WEIGHT HISTORY: Pt reports ~31 lb wt loss x 8 months (17%, significant).  Patient Weight(s) for the past 336 hrs:   Weight   01/20/24 1633 69.9 kg (154 lb 1.6 oz)   01/20/24 1205 70.3 kg (155 lb)       Wt Readings from Last 10 Encounters:   01/20/24 69.9 kg (154 lb 1.6 oz)   11/13/23 73.6 kg (162 lb 4 oz)        NUTRITION:  Diet:       Procedures    NPO        Percent Meals Eaten (last 3 days)       None            Food Allergies: No  Cultural/Ethnic/Christian Preferences Addressed: Yes    GI SYSTEM REVIEW:  +g-tube  Skin/Wounds: WNL    NUTRITION RELATED PHYSICAL FINDINGS:     1. Body Fat/Muscle Mass: mild muscle depletion Clavicle region, Thigh region, and Calf region     2. Fluid Accumulation: none per RN documentation     NUTRITION PRESCRIPTION: 69.9 kg  Calories: 1681-5582 calories/day (25-30 kcal/kg)  Protein:  grams protein/day (1.0-1.5 gm/kg)  Fluid: ~1 ml/kcal or per MD discretion    NUTRITION DIAGNOSIS/PROBLEM:  Malnutrition related to decreased intake as evidenced by documented/reported unintentional weight loss and loss of muscle mass    MONITOR AND EVALUATE/NUTRITION GOALS:  PO intake of 75% of meals TID - New  Weight stable within 1 to 2 lbs during admission - New  Start alternative nutrition in 24-48 hrs if diet is not able to advance- New      MEDICATIONS:  Folic acid 1 mg, venofer, protonix, abx  Gtt: NS at 100 ml/hr    LABS:  Na 120    Pt is at High nutrition risk    Hanny Grullon RD, LDN, CNSC  Clinical Dietitian  Spectra: [id number]

## 2024-01-21 NOTE — PROGRESS NOTES
[facility]     Hospitalist Progress Note     Jose Angel Calderon III Patient Status:  Inpatient    3/27/1960 MRN [id number]   Location [facility] 4NW-A Attending Babar Escobar MD   Hosp Day # 1 PCP No primary care provider on file.     Chief Complaint: seroma    Subjective:     Patient doing well, swallowing    Objective:    Review of Systems:   A comprehensive review of systems was completed; pertinent positive and negatives stated in subjective.    Vital signs:  Temp:  [97.7 °F (36.5 °C)-98.2 °F (36.8 °C)] 98.2 °F (36.8 °C)  Pulse:  [59-69] 59  Resp:  [12-19] 14  BP: (120-160)/(67-96) 120/79  SpO2:  [95 %-100 %] 96 %    Physical Exam:    General: No acute distress  ENT: open airway, bandages, no significant swelling  Respiratory: No wheezes, no rhonchi  Cardiovascular: S1, S2, regular rate and rhythm  Abdomen: Soft, Non-tender, non-distended, positive bowel sounds  Neuro: No new focal deficits.   Extremities: No edema      Diagnostic Data:    Labs:  Recent Labs   Lab 24  1244 24  0810   WBC 5.0 3.9*   HGB 9.7* 9.1*   MCV 88.9 86.1   .0 214.0       Recent Labs   Lab 24  1244 24  0810   GLU 96 92   BUN 15 10   CREATSERUM 0.82 0.74   CA 9.0 8.7   ALB 3.6 3.4   * 125*   K 5.0 4.3   CL 86* 91*   CO2 30.0 27.0   ALKPHO 73 61   AST 18 7*   BILT 0.4 0.4   TP 6.9 6.1*       Estimated Creatinine Clearance: 98.9 mL/min (based on SCr of 0.74 mg/dL).    No results for input(s): \"TROP\", \"TROPHS\", \"CK\" in the last 168 hours.    No results for input(s): \"PTP\", \"INR\" in the last 168 hours.    Lab Results   Component Value Date    TSH 0.823 2024             Microbiology    No results found for this visit on 24.      Imaging: Reviewed in Epic.    Medications:    iron sucrose  200 mg Intravenous Daily    sodium chloride  1 g Oral TID CC    amLODIPine  10 mg Oral Daily    atenolol  50 mg Oral Daily    folic acid  1 mg Oral Daily    pantoprazole  20 mg Oral BID AC    enoxaparin   40 mg Subcutaneous Daily    piperacillin-tazobactam  3.375 g Intravenous Q8H       Assessment & Plan:      #post op seroma/fluid collection  #epiglottitis  -d/w ENT in detail.   -suspect post op seroma. Doubt infection as no white count, fever, etc. Check procal. If negative and bcxs negative will stop empiric abx.   -has open airway. No significant swelling. Could consider steroids but will hold for now.  -resume diet. If tolerates well, will have pt f/u w/ his normal surgeon.      #squamous cell carcinoma of base of tongue  -onc  -just completed chemo/xrt. S/p recent surgical resection of mass     #hyponatremia, suspect 2/2 SIADH/excess free water intake  -IVF  -nephro consult  -urine studies  -fluid restriction  -Na tabs  -improving     #dietary  -consult nutrition for tube feeds     #incidental meningioma  -outpt MRI  -d/w pt in detail     Babar Escobar MD    Supplementary Documentation:     Quality:  DVT Mechanical Prophylaxis:   SCDs,    DVT Pharmacologic Prophylaxis   Medication    enoxaparin (Lovenox) 40 MG/0.4ML SUBQ injection 40 mg                Code Status: Not on file  Toscano: No urinary catheter in place  Toscano Duration (in days):   Central line:    INA:     Discharge is dependent on: course  At this point Mr. Calderon is expected to be discharge to: home    The 21st Century Cures Act makes medical notes like these available to patients in the interest of transparency. Please be advised this is a medical document. Medical documents are intended to carry relevant information, facts as evident, and the clinical opinion of the practitioner. The medical note is intended as peer to peer communication and may appear blunt or direct. It is written in medical language and may contain abbreviations or verbiage that are unfamiliar.     Dietitian Malnutrition Assessment        Evaluation for Malnutrition: Criteria for non-severe malnutrition diagnosis-         chronic illness related to   Wt loss 10% in 6 months.,  Muscle mass mild depletion.       RD Malnutrition Care Plan: See RD nutrition assessment for additional recommendations.    Body Fat/Muscle Mass:          Physician Assessment    Patient has a diagnosis of moderate malnutrition

## 2024-01-21 NOTE — PLAN OF CARE
Patient transferred from overflow unit, complains of worsening cough. Recently, patient had neck mass removed and CT shows fluid collection at the resection site. NPO since midnight, ENT to see the pt in the morning. Patient is alert and oriented, on 2L at night. IVF infusing, meds given peer MAR. No other complaints overnight. Safety precautions in place, call light within reach, plan of care ongoing.     Problem: RESPIRATORY - ADULT  Goal: Achieves optimal ventilation and oxygenation  Description: INTERVENTIONS:  - Assess for changes in respiratory status  - Assess for changes in mentation and behavior  - Position to facilitate oxygenation and minimize respiratory effort  - Oxygen supplementation based on oxygen saturation or ABGs  - Provide Smoking Cessation handout, if applicable  - Encourage broncho-pulmonary hygiene including cough, deep breathe, Incentive Spirometry  - Assess the need for suctioning and perform as needed  - Assess and instruct to report SOB or any respiratory difficulty  - Respiratory Therapy support as indicated  - Manage/alleviate anxiety  - Monitor for signs/symptoms of CO2 retention  Outcome: Progressing

## 2024-01-21 NOTE — PROGRESS NOTES
Assumed care around 1930. A/Ox4. On RA w/ sats >90. Monitor shows NSR. Continent of bowel and bladder. No reports of pain. Up SBA. Plan to cont IVF, cont abx, ENT to see, nephrology to see. Safety precautions in place, call light within reach, bed alarm on.     Order for transfer, report given to HERMINIO Maciel.

## 2024-01-21 NOTE — CONSULTS
[facility]  Report of Consultation    Jose Angel Calderon III Patient Status:  Inpatient    3/27/1960 MRN [id number]   Location [facility] 4NW-A Attending Babar Escobar MD   Hosp Day # 1 PCP No primary care provider on file.       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 TSH WNL. Meds benign without SSRI / thiazides. Denies GI losses. Euvolemic. PLAN- OK to continue usual TF / FWF; add salt tabs 1 g TID. Continue NS    2) Squamous cell CA of neck- s/p cisplatin / 5-FU / taxotere + RT -> neck dissection     3) L temporal meningioma    4) RA on methotrexate    5) Anemia      Reason for Consultation:  Hyponatremia    History of Present Illness:  Jose Angel Calderon III is a a(n) 63 year old male.  Very pleasant 63-year-old male with a history of squamous cell carcinoma of the head and neck status post chemoradiation underwent a recent neck dissection to remove residual mass as well as multiple lymph nodes 1 of which were positive.  Over the past week, he feels that there is something in the back of his throat causing a cough.  Denies any nausea vomiting diarrhea.  No changes in his medications.  He has been trying to consume more salt than usual.  He takes 4 to 5 cans of tube feeding daily with about 1 to 1.5 L of free water flushes; he also drinks water continuously due to dry mouth.  He tries to eat something solid for dinner.    History:  Past Medical History:   Diagnosis Date    Esophageal reflux     Essential hypertension     Throat cancer (HCC)      Past Surgical History:   Procedure Laterality Date    FOOT JOINT ARTHORCENTESIS Right     REPAIR ING HERNIA,5+Y/O,REDUCIBL      THROAT SURGERY PROCEDURE UNLISTED       No family history on file.  Denies family  history of kidney disease.    reports that he has quit smoking. His smoking use included cigarettes. He has never used smokeless tobacco. He reports that he does not currently use alcohol. He reports that he does not use drugs.    Allergies:  No Known Allergies    Medications:    Current Facility-Administered Medications:     iron sucrose (Venofer) 20 mg/mL injection 200 mg, 200 mg, Intravenous, Daily    amLODIPine (Norvasc) tab 10 mg, 10 mg, Oral, Daily    atenolol (Tenormin) tab 50 mg, 50 mg, Oral, Daily    folic acid (Folvite) tab 1 mg, 1 mg, Oral, Daily    pantoprazole (Protonix) DR tab 20 mg, 20 mg, Oral, BID AC    acetaminophen (Tylenol Extra Strength) tab 1,000 mg, 1,000 mg, Oral, Q4H PRN    melatonin tab 3 mg, 3 mg, Oral, Nightly PRN    polyethylene glycol (PEG 3350) (Miralax) 17 g oral packet 17 g, 17 g, Oral, Daily PRN    sennosides (Senokot) tab 17.2 mg, 17.2 mg, Oral, Nightly PRN    bisacodyl (Dulcolax) 10 MG rectal suppository 10 mg, 10 mg, Rectal, Daily PRN    fleet enema (Fleet) 7-19 GM/118ML rectal enema 133 mL, 1 enema, Rectal, Once PRN    benzonatate (Tessalon) cap 200 mg, 200 mg, Oral, TID PRN    sodium chloride 0.9% infusion, , Intravenous, Continuous    enoxaparin (Lovenox) 40 MG/0.4ML SUBQ injection 40 mg, 40 mg, Subcutaneous, Daily    ondansetron (Zofran) 4 MG/2ML injection 4 mg, 4 mg, Intravenous, Q6H PRN    prochlorperazine (Compazine) 10 MG/2ML injection 5 mg, 5 mg, Intravenous, Q8H PRN    guaiFENesin (Robitussin) 100 MG/5 ML oral liquid 200 mg, 200 mg, Oral, Q4H PRN    sodium chloride (Saline Mist) 0.65 % nasal solution 1 spray, 1 spray, Each Nare, Q3H PRN    glycerin-hypromellose- (Artifical Tears) 0.2-0.2-1 % ophthalmic solution 1 drop, 1 drop, Both Eyes, QID PRN    piperacillin-tazobactam (Zosyn) 3.375 g in dextrose 5% 100 mL IVPB-ADDV, 3.375 g, Intravenous, Q8H  No current outpatient medications on file.       Review of Systems:  Please see HPI for pertinent positives. 10 point  review of systems otherwise reviewed and negative.     Physical Exam:  /83 (BP Location: Left arm)   Pulse 60   Temp 98.1 °F (36.7 °C) (Oral)   Resp 12   Ht 5' 8\" (1.727 m)   Wt 154 lb 1.6 oz (69.9 kg)   SpO2 95%   BMI 23.43 kg/m²   Temp (24hrs), Av °F (36.7 °C), Min:97.7 °F (36.5 °C), Max:98.1 °F (36.7 °C)       Intake/Output Summary (Last 24 hours) at 2024 0850  Last data filed at 2024 0838  Gross per 24 hour   Intake --   Output 1225 ml   Net -1225 ml     Wt Readings from Last 3 Encounters:   24 154 lb 1.6 oz (69.9 kg)   23 162 lb 4 oz (73.6 kg)     General: awake alert  HEENT: No scleral icterus, MMM  Neck: Supple, no MIKE or thyromegaly  Cardiac: Regular rate and rhythm, S1, S2 normal, no murmur, rub, or gallop  Lungs: Decreased breath sounds at the bases bilaterally.   Abdomen: Soft, non-tender. + bowel sounds, no palpable organomegaly  Extremities: Without clubbing, cyanosis; no edema  Neurologic: Cranial nerves grossly intact, moving all extremities  Skin: Warm and dry, no rashes      Laboratory Data:  Lab Results   Component Value Date    WBC 3.9 2024    HGB 9.1 2024    HCT 26.1 2024    .0 2024    CREATSERUM 0.82 2024    BUN 15 2024     2024    K 5.0 2024    CL 86 2024    CO2 30.0 2024    GLU 96 2024    CA 9.0 2024    ALB 3.6 2024    ALKPHO 73 2024    BILT 0.4 2024    TP 6.9 2024    AST 18 2024    ALT  2024      Comment:      Due to  backorder we are temporarily unable to offer hospital-based ALT testing at [facility].   If urgently needed, please order ALT test code [id number].   The new order will need a new venipuncture and will be sent to [facility] for testing.   The expected turnaround time will be within 24 hours.     TSH 0.823 2024       Imaging:  All imaging studies reviewed.      Thank you for allowing me to participate in  the care of your patient.    Ezra Monk MD  1/21/2024  8:50 AM

## 2024-01-22 LAB
ANION GAP SERPL CALC-SCNC: 5 MMOL/L (ref 0–18)
BUN BLD-MCNC: 9 MG/DL (ref 9–23)
CALCIUM BLD-MCNC: 8.8 MG/DL (ref 8.5–10.1)
CHLORIDE SERPL-SCNC: 95 MMOL/L (ref 98–112)
CO2 SERPL-SCNC: 28 MMOL/L (ref 21–32)
CREAT BLD-MCNC: 0.66 MG/DL
EGFRCR SERPLBLD CKD-EPI 2021: 105 ML/MIN/1.73M2 (ref 60–?)
ERYTHROCYTE [DISTWIDTH] IN BLOOD BY AUTOMATED COUNT: 13.6 %
GLUCOSE BLD-MCNC: 109 MG/DL (ref 70–99)
GLUCOSE BLD-MCNC: 116 MG/DL (ref 70–99)
GLUCOSE BLD-MCNC: 129 MG/DL (ref 70–99)
GLUCOSE BLD-MCNC: 162 MG/DL (ref 70–99)
GLUCOSE BLD-MCNC: 95 MG/DL (ref 70–99)
HCT VFR BLD AUTO: 26.6 %
HGB BLD-MCNC: 9 G/DL
MCH RBC QN AUTO: 29.8 PG (ref 26–34)
MCHC RBC AUTO-ENTMCNC: 33.8 G/DL (ref 31–37)
MCV RBC AUTO: 88.1 FL
OSMOLALITY SERPL CALC.SUM OF ELEC: 264 MOSM/KG (ref 275–295)
PLATELET # BLD AUTO: 193 10(3)UL (ref 150–450)
POTASSIUM SERPL-SCNC: 4.2 MMOL/L (ref 3.5–5.1)
RBC # BLD AUTO: 3.02 X10(6)UL
SODIUM SERPL-SCNC: 128 MMOL/L (ref 136–145)
WBC # BLD AUTO: 3.8 X10(3) UL (ref 4–11)

## 2024-01-22 PROCEDURE — 99233 SBSQ HOSP IP/OBS HIGH 50: CPT | Performed by: INTERNAL MEDICINE

## 2024-01-22 PROCEDURE — 99232 SBSQ HOSP IP/OBS MODERATE 35: CPT | Performed by: HOSPITALIST

## 2024-01-22 RX ORDER — TOLVAPTAN 15 MG/1
15 TABLET ORAL ONCE
Status: COMPLETED | OUTPATIENT
Start: 2024-01-22 | End: 2024-01-22

## 2024-01-22 RX ORDER — METHOTREXATE 2.5 MG/1
5 TABLET ORAL WEEKLY
Status: DISCONTINUED | OUTPATIENT
Start: 2024-01-22 | End: 2024-01-22

## 2024-01-22 RX ORDER — METHOTREXATE 2.5 MG/1
15 TABLET ORAL
COMMUNITY

## 2024-01-22 NOTE — PROGRESS NOTES
[facility]     Hospitalist Progress Note     Jose Angel Calderon III Patient Status:  Inpatient    3/27/1960 MRN [id number]   Location [facility] 4NW-A Attending Babar Escobar MD   Hosp Day # 2 PCP No primary care provider on file.     Chief Complaint: seroma    Subjective:     Patient doing well, swallowing. No f/c    Objective:    Review of Systems:   A comprehensive review of systems was completed; pertinent positive and negatives stated in subjective.    Vital signs:  Temp:  [97.7 °F (36.5 °C)-98.2 °F (36.8 °C)] 98.1 °F (36.7 °C)  Pulse:  [59-68] 62  Resp:  [14-18] 18  BP: (120-151)/(72-83) 151/83  SpO2:  [95 %-100 %] 97 %    Physical Exam:    General: No acute distress  ENT: open airway, bandages, no significant swelling  Respiratory: No wheezes, no rhonchi  Cardiovascular: S1, S2, regular rate and rhythm  Abdomen: Soft, Non-tender, non-distended, positive bowel sounds  Neuro: No new focal deficits.   Extremities: No edema      Diagnostic Data:    Labs:  Recent Labs   Lab 24  1244 24  0810 24  0648   WBC 5.0 3.9* 3.8*   HGB 9.7* 9.1* 9.0*   MCV 88.9 86.1 88.1   .0 214.0 193.0       Recent Labs   Lab 24  1244 24  0810 24  0648   GLU 96 92 95   BUN 15 10 9   CREATSERUM 0.82 0.74 0.66*   CA 9.0 8.7 8.8   ALB 3.6 3.4  --    * 125* 128*   K 5.0 4.3 4.2   CL 86* 91* 95*   CO2 30.0 27.0 28.0   ALKPHO 73 61  --    AST 18 7*  --    BILT 0.4 0.4  --    TP 6.9 6.1*  --        Estimated Creatinine Clearance: 110.8 mL/min (A) (based on SCr of 0.66 mg/dL (L)).    No results for input(s): \"TROP\", \"TROPHS\", \"CK\" in the last 168 hours.    No results for input(s): \"PTP\", \"INR\" in the last 168 hours.                 Microbiology    Hospital Encounter on 24   1. Blood Culture     Status: None (Preliminary result)    Collection Time: 24  4:56 PM    Specimen: Blood,peripheral   Result Value Ref Range    Blood Culture Result No Growth 1 Day N/A         Imaging:  Reviewed in Epic.    Medications:    tolvaptan  15 mg Oral Once    iron sucrose  200 mg Intravenous Daily    sodium chloride  1 g Oral TID CC    amLODIPine  10 mg Oral Daily    atenolol  50 mg Oral Daily    folic acid  1 mg Oral Daily    pantoprazole  20 mg Oral BID AC    enoxaparin  40 mg Subcutaneous Daily    piperacillin-tazobactam  3.375 g Intravenous Q8H       Assessment & Plan:      #post op seroma/fluid collection  #epiglottitis  -d/w ENT in detail.   -suspect post op seroma. Doubt infection as no white count, fever, etc. Check procal. If negative and bcxs negative will stop empiric abx.   -has open airway. No significant swelling. Could consider steroids but will hold for now.  -will have pt f/u w/ his normal surgeon     #squamous cell carcinoma of base of tongue  -onc  -just completed chemo/xrt. S/p recent surgical resection of mass     #hyponatremia, suspect 2/2 SIADH/excess free water intake  -IVF  -nephro consult  -fluid restriction  -Na tabs  -improving but still <130  -tolvaptan today     #dietary  -consult nutrition for tube feeds     #incidental meningioma  -outpt MRI  -d/w pt in detail     POC: dc when ok w/ nephro as Na improves    Babar Escobar MD    Supplementary Documentation:     Quality:  DVT Mechanical Prophylaxis:   SCDs,    DVT Pharmacologic Prophylaxis   Medication    enoxaparin (Lovenox) 40 MG/0.4ML SUBQ injection 40 mg                Code Status: Not on file  Toscano: No urinary catheter in place  Toscano Duration (in days):   Central line:    INA:     Discharge is dependent on: course  At this point Mr. Calderon is expected to be discharge to: home    The 21st Century Cures Act makes medical notes like these available to patients in the interest of transparency. Please be advised this is a medical document. Medical documents are intended to carry relevant information, facts as evident, and the clinical opinion of the practitioner. The medical note is intended as peer to peer communication  and may appear blunt or direct. It is written in medical language and may contain abbreviations or verbiage that are unfamiliar.     Dietitian Malnutrition Assessment        Evaluation for Malnutrition: Criteria for non-severe malnutrition diagnosis-         chronic illness related to   Wt loss 10% in 6 months., Muscle mass mild depletion.       RD Malnutrition Care Plan: See RD nutrition assessment for additional recommendations.    Body Fat/Muscle Mass:          Physician Assessment    Patient has a diagnosis of moderate malnutrition

## 2024-01-22 NOTE — PLAN OF CARE
Pt received A&Ox4. Afebrile. VSS. Denies pain. Pt reported 2-3 brief episodes today where he felt it was difficult to breathe, no further complaints. Neck dressing c/d/i. ENT spoke w/ Dr. Escobar. No IP management at this time - Dr. Escobar to re-eval needs tomorrow. Pt to f/u OP w/ his surgeon. Diet resumed, 1500ml fluid restriction in place. Christina Schaffer TF boluses ordered per RD - administered via g-tube. Q6h accuchecks. 3 episodes of diarrhea, stool sample collected. Seen by Dr. Monk. Na 125. Pt started on sodium chloride tabs w/ meals. IVF infusing @ 100ml/hr. IV zosyn per MAR. Fall precautions in place. Call light in reach.

## 2024-01-22 NOTE — PLAN OF CARE
Received pt a/o x4. VSS. Afebrile. RA. Denies pain. IVF & IV abx infusing. Medication admin per MAR. Throat/ neck dressing intact. Denies dyspnea. Plan for ENT consult today. Voiding via urinal. Call light within reach. Safety precautions in place.     Problem: RESPIRATORY - ADULT  Goal: Achieves optimal ventilation and oxygenation  Description: INTERVENTIONS:  - Assess for changes in respiratory status  - Assess for changes in mentation and behavior  - Position to facilitate oxygenation and minimize respiratory effort  - Oxygen supplementation based on oxygen saturation or ABGs  - Provide Smoking Cessation handout, if applicable  - Encourage broncho-pulmonary hygiene including cough, deep breathe, Incentive Spirometry  - Assess the need for suctioning and perform as needed  - Assess and instruct to report SOB or any respiratory difficulty  - Respiratory Therapy support as indicated  - Manage/alleviate anxiety  - Monitor for signs/symptoms of CO2 retention  Outcome: Progressing

## 2024-01-22 NOTE — CONSULTS
[facility]  Report of Consultation    Jose Angel Calderon III Patient Status:  Inpatient    3/27/1960 MRN [id number]   [facility] 4NW-A Attending Babar Escobar MD   Hosp Day # 2 PCP No primary care provider on file.       No acute events  Feels well      Current Facility-Administered Medications:     iron sucrose (Venofer) 20 mg/mL injection 200 mg, 200 mg, Intravenous, Daily    sodium chloride tab 1 g, 1 g, Oral, TID CC    dextrose 10% infusion (TPN no rate), , Intravenous, Continuous PRN    pancrelipase (Lip-Prot-Amyl) (Zenpep) DR particles cap 10,000 Units, 10,000 Units, Per G Tube, PRN **AND** sodium bicarbonate tab 325 mg, 325 mg, Per G Tube, PRN    amLODIPine (Norvasc) tab 10 mg, 10 mg, Oral, Daily    atenolol (Tenormin) tab 50 mg, 50 mg, Oral, Daily    folic acid (Folvite) tab 1 mg, 1 mg, Oral, Daily    pantoprazole (Protonix) DR tab 20 mg, 20 mg, Oral, BID AC    acetaminophen (Tylenol Extra Strength) tab 1,000 mg, 1,000 mg, Oral, Q4H PRN    melatonin tab 3 mg, 3 mg, Oral, Nightly PRN    polyethylene glycol (PEG 3350) (Miralax) 17 g oral packet 17 g, 17 g, Oral, Daily PRN    sennosides (Senokot) tab 17.2 mg, 17.2 mg, Oral, Nightly PRN    bisacodyl (Dulcolax) 10 MG rectal suppository 10 mg, 10 mg, Rectal, Daily PRN    fleet enema (Fleet) 7-19 GM/118ML rectal enema 133 mL, 1 enema, Rectal, Once PRN    benzonatate (Tessalon) cap 200 mg, 200 mg, Oral, TID PRN    sodium chloride 0.9% infusion, , Intravenous, Continuous    enoxaparin (Lovenox) 40 MG/0.4ML SUBQ injection 40 mg, 40 mg, Subcutaneous, Daily    ondansetron (Zofran) 4 MG/2ML injection 4 mg, 4 mg, Intravenous, Q6H PRN    prochlorperazine (Compazine) 10 MG/2ML injection 5 mg, 5 mg, Intravenous, Q8H PRN    guaiFENesin (Robitussin) 100 MG/5 ML oral liquid 200 mg, 200 mg, Oral, Q4H PRN    sodium chloride (Saline Mist) 0.65 % nasal solution 1 spray, 1 spray, Each Nare, Q3H PRN    glycerin-hypromellose- (Artifical Tears) 0.2-0.2-1 %  ophthalmic solution 1 drop, 1 drop, Both Eyes, QID PRN    piperacillin-tazobactam (Zosyn) 3.375 g in dextrose 5% 100 mL IVPB-ADDV, 3.375 g, Intravenous, Q8H    Physical Exam:  /83   Pulse 62   Temp 98.1 °F (36.7 °C) (Temporal)   Resp 18   Ht 5' 8\" (1.727 m)   Wt 152 lb (68.9 kg)   SpO2 97%   BMI 23.11 kg/m²   Temp (24hrs), Av °F (36.7 °C), Min:97.7 °F (36.5 °C), Max:98.2 °F (36.8 °C)       Intake/Output Summary (Last 24 hours) at 2024 1141  Last data filed at 2024 0600  Gross per 24 hour   Intake 3412 ml   Output 2700 ml   Net 712 ml     Wt Readings from Last 3 Encounters:   24 152 lb (68.9 kg)   23 162 lb 4 oz (73.6 kg)     General: awake alert  HEENT: No scleral icterus, MMM  Neck: Supple, no MIKE or thyromegaly  Cardiac: Regular rate and rhythm, S1, S2 normal, no murmur, rub, or gallop  Lungs: Decreased breath sounds at the bases bilaterally.   Abdomen: Soft, non-tender. 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  Meds benign without SSRI / thiazides. Denies GI losses. Euvolemic.   Improved w/ addition of salt tabs/saline  - monitor  ; dose w/ tolvaptan x 1     2. Squamous cell CA of neck- s/p cisplatin / 5-FU / taxotere + RT -> neck dissection     3. L temporal meningioma    4. RA on methotrexate    5.  Anemia    Nilo Abbott  2024

## 2024-01-23 VITALS
HEIGHT: 68 IN | BODY MASS INDEX: 23.04 KG/M2 | OXYGEN SATURATION: 100 % | HEART RATE: 65 BPM | RESPIRATION RATE: 18 BRPM | WEIGHT: 152 LBS | TEMPERATURE: 99 F | DIASTOLIC BLOOD PRESSURE: 83 MMHG | SYSTOLIC BLOOD PRESSURE: 140 MMHG

## 2024-01-23 LAB
ANION GAP SERPL CALC-SCNC: 1 MMOL/L (ref 0–18)
BASOPHILS # BLD AUTO: 0.04 X10(3) UL (ref 0–0.2)
BASOPHILS NFR BLD AUTO: 0.9 %
BUN BLD-MCNC: 11 MG/DL (ref 9–23)
CALCIUM BLD-MCNC: 9.1 MG/DL (ref 8.5–10.1)
CHLORIDE SERPL-SCNC: 103 MMOL/L (ref 98–112)
CO2 SERPL-SCNC: 32 MMOL/L (ref 21–32)
CREAT BLD-MCNC: 1 MG/DL
EGFRCR SERPLBLD CKD-EPI 2021: 85 ML/MIN/1.73M2 (ref 60–?)
EOSINOPHIL # BLD AUTO: 0.38 X10(3) UL (ref 0–0.7)
EOSINOPHIL NFR BLD AUTO: 8.6 %
ERYTHROCYTE [DISTWIDTH] IN BLOOD BY AUTOMATED COUNT: 13.7 %
GLUCOSE BLD-MCNC: 107 MG/DL (ref 70–99)
GLUCOSE BLD-MCNC: 120 MG/DL (ref 70–99)
GLUCOSE BLD-MCNC: 123 MG/DL (ref 70–99)
HCT VFR BLD AUTO: 29.7 %
HGB BLD-MCNC: 9.7 G/DL
IMM GRANULOCYTES # BLD AUTO: 0.02 X10(3) UL (ref 0–1)
IMM GRANULOCYTES NFR BLD: 0.5 %
LYMPHOCYTES # BLD AUTO: 0.48 X10(3) UL (ref 1–4)
LYMPHOCYTES NFR BLD AUTO: 10.9 %
MCH RBC QN AUTO: 29.7 PG (ref 26–34)
MCHC RBC AUTO-ENTMCNC: 32.7 G/DL (ref 31–37)
MCV RBC AUTO: 90.8 FL
MONOCYTES # BLD AUTO: 0.56 X10(3) UL (ref 0.1–1)
MONOCYTES NFR BLD AUTO: 12.7 %
NEUTROPHILS # BLD AUTO: 2.92 X10 (3) UL (ref 1.5–7.7)
NEUTROPHILS # BLD AUTO: 2.92 X10(3) UL (ref 1.5–7.7)
NEUTROPHILS NFR BLD AUTO: 66.4 %
OSMOLALITY SERPL CALC.SUM OF ELEC: 282 MOSM/KG (ref 275–295)
PLATELET # BLD AUTO: 219 10(3)UL (ref 150–450)
POTASSIUM SERPL-SCNC: 4.3 MMOL/L (ref 3.5–5.1)
RBC # BLD AUTO: 3.27 X10(6)UL
SODIUM SERPL-SCNC: 136 MMOL/L (ref 136–145)
WBC # BLD AUTO: 4.4 X10(3) UL (ref 4–11)

## 2024-01-23 PROCEDURE — 99239 HOSP IP/OBS DSCHRG MGMT >30: CPT | Performed by: HOSPITALIST

## 2024-01-23 RX ORDER — SODIUM CHLORIDE 1 G/1
1 TABLET ORAL
Qty: 90 TABLET | Refills: 0 | Status: SHIPPED | OUTPATIENT
Start: 2024-01-23 | End: 2024-04-24

## 2024-01-23 NOTE — DISCHARGE SUMMARY
[facility]     Jose Angel Calderon III Patient Status:  Inpatient    3/27/1960 MRN [id number]   Location [facility] 4NW-A Attending No att. providers found   Hosp Day # 3 PCP No primary care provider on file.     Date of Admission: 2024  Date of Discharge:  2024     Discharge Disposition: Home or Self Care    Discharge Diagnosis:  #post op seroma/fluid collection  #epiglottitis  -d/w ENT in detail.   -suspect post op seroma. Doubt infection as no white count, fever, etc. Check procal. If negative and bcxs negative will stop empiric abx.   -has open airway. No significant swelling. Could consider steroids but will hold for now.  -will have pt f/u w/ his normal surgeon     #squamous cell carcinoma of base of tongue  -onc  -just completed chemo/xrt. S/p recent surgical resection of mass     #hyponatremia, suspect 2/2 SIADH/excess free water intake  -IVF  -nephro consult  -fluid restriction  -Na tabs  -improved  -tolvaptan      #dietary  -consult nutrition for tube feeds     #incidental meningioma  -outpt MRI  -d/w pt in detail     History of Present Illness: Jose Angel Calderon III is a 63 year old male with PMH squamous cell carcinoma of base of tongue s/p chemo/xrt, TRIP, GERD, HTN who p/w sob/cough. Has had chronic cough since chemo started. Completed chemo/xrt in November. Had surgery in December to remove mass and 9 lymph nodes (1 of which was cancer positive). Past week, has worsened. Now w/ SOB. Has a feeling of something in the back of his throat causing the cough. Denies f/c, cp, abd pain, n/v. Drinks about 2.5 bottles of water a day (1500cc) and does [employer] tube feed boluses 3x daily flushed w/ 250cc of free water. Has a hx of hyponatremia.      Brief Synopsis: pt w/ post op seroma in neck. Reviewed w/ ENT, no signs of infection. Has open airway. Can hold off on steroids and dc to f/u w/ his normal surgeon to review. 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 May be 2/2 excess free water intake and poor solute intake. Started on salt tabs. Corrected w/ tolvaptan. Ok to DC home.     Lace+ Score: 58  59-90 High Risk  29-58 Medium Risk  0-28   Low Risk       TCM Follow-Up Recommendation:  LACE 29-58: Moderate Risk of readmission after discharge from the hospital.      Procedures during hospitalization:   none    Incidental or significant findings and recommendations (brief descriptions):  none    Lab/Test results pending at Discharge:   none    Consultants:  ENT, nephro    Discharge Medication List:     Discharge Medications        START taking these medications        Instructions Prescription details   sodium chloride 1 g Tabs      Take 1 tablet (1 g total) by mouth 3 (three) times daily with meals.   Quantity: 90 tablet  Refills: 0            CONTINUE taking these medications        Instructions Prescription details   amLODIPine 10 MG Tabs  Commonly known as: Norvasc      Take 1 tablet (10 mg total) by mouth daily.   Refills: 0     atenolol 50 MG Tabs  Commonly known as: Tenormin      Take 1 tablet (50 mg total) by mouth daily.   Refills: 0     folic acid 1 MG Tabs  Commonly known as: Folvite      Take by mouth daily.   Refills: 0     methotrexate 2.5 MG Tabs  Commonly known as: Rheumatrex      Take 6 tablets (15 mg total) by mouth Every Monday.   Refills: 0     omeprazole 20 MG Cpdr  Commonly known as: PriLOSEC      Take 1 capsule (20 mg total) by mouth in the morning and 1 capsule (20 mg total) before bedtime.   Refills: 0     ondansetron 4 MG Tbdp  Commonly known as: Zofran-ODT      Take 1 tablet (4 mg total) by mouth every 8 (eight) hours as needed for Nausea.   Refills: 0               Where to Get Your Medications        These medications were sent to [id number] - [place], [place] - [address] [phone], [phone]  [address] [facility] [place] [address]      Phone: [phone]   sodium chloride 1 g  Tabs         [facility] yes    Follow-up appointment:   Ezra Monk MD  [address]  [facility] [address]  [phone]    Follow up in 1 week(s)      PCP    Schedule an appointment as soon as possible for a visit      Appointments for Next 30 Days 2024 - 2024        Date Arrival Time Visit Type Length Department Provider     2024  4:00 PM  HOSPITAL FOLLOW UP [2830] 20 min [facility] Nephrology Adriel Mendoza MD    Patient Instructions:         Location Instructions:     Masks are optional for all patients and visitors, unless otherwise indicated.                      Vital signs:  Temp:  [97.4 °F (36.3 °C)-98.7 °F (37.1 °C)] 98.7 °F (37.1 °C)  Pulse:  [64-72] 65  Resp:  [16-20] 18  BP: (123-147)/(76-88) 140/83  SpO2:  [96 %-100 %] 100 %    Physical Exam:    General: No acute distress   Lungs: clear to auscultation  Cardiovascular: S1, S2  Abdomen: Soft      -----------------------------------------------------------------------------------------------  PATIENT DISCHARGE INSTRUCTIONS: See electronic chart    Babar Escobar MD    Total time spent on discharge plannin minutes     The  [employer] Cures Act makes medical notes like these available to patients in the interest of transparency. Please be advised this is a medical document. Medical documents are intended to carry relevant information, facts as evident, and the clinical opinion of the practitioner. The medical note is intended as peer to peer communication and may appear blunt or direct. It is written in medical language and may contain abbreviations or verbiage that are unfamiliar.

## 2024-01-23 NOTE — PLAN OF CARE
Pt A/O x4. VSS. Afebrile. Pt denies pain throughout day. Medications admin per MAR. Pt ambulated safely in room. 1500cc fluid restriction. TF bolus' admin per order. Pt tolerated well. Sodium 136. Pt cleared for DC. Fall and safety precautions in place. Call light within reach.

## 2024-01-23 NOTE — PROGRESS NOTES
[facility]  Progress  Note    Jose Angel Calderon III Patient Status:  Inpatient    3/27/1960 MRN [id number]   [facility] 4NW-A Attending Babar Escobar MD   Hosp Day # 3 PCP No primary care provider on file.       No acute events         Current Facility-Administered Medications:     sodium chloride tab 1 g, 1 g, Oral, TID CC    dextrose 10% infusion (TPN no rate), , Intravenous, Continuous PRN    pancrelipase (Lip-Prot-Amyl) (Zenpep) DR particles cap 10,000 Units, 10,000 Units, Per G Tube, PRN **AND** sodium bicarbonate tab 325 mg, 325 mg, Per G Tube, PRN    amLODIPine (Norvasc) tab 10 mg, 10 mg, Oral, Daily    atenolol (Tenormin) tab 50 mg, 50 mg, Oral, Daily    folic acid (Folvite) tab 1 mg, 1 mg, Oral, Daily    pantoprazole (Protonix) DR tab 20 mg, 20 mg, Oral, BID AC    acetaminophen (Tylenol Extra Strength) tab 1,000 mg, 1,000 mg, Oral, Q4H PRN    melatonin tab 3 mg, 3 mg, Oral, Nightly PRN    polyethylene glycol (PEG 3350) (Miralax) 17 g oral packet 17 g, 17 g, Oral, Daily PRN    sennosides (Senokot) tab 17.2 mg, 17.2 mg, Oral, Nightly PRN    bisacodyl (Dulcolax) 10 MG rectal suppository 10 mg, 10 mg, Rectal, Daily PRN    fleet enema (Fleet) 7-19 GM/118ML rectal enema 133 mL, 1 enema, Rectal, Once PRN    benzonatate (Tessalon) cap 200 mg, 200 mg, Oral, TID PRN    sodium chloride 0.9% infusion, , Intravenous, Continuous    enoxaparin (Lovenox) 40 MG/0.4ML SUBQ injection 40 mg, 40 mg, Subcutaneous, Daily    ondansetron (Zofran) 4 MG/2ML injection 4 mg, 4 mg, Intravenous, Q6H PRN    prochlorperazine (Compazine) 10 MG/2ML injection 5 mg, 5 mg, Intravenous, Q8H PRN    guaiFENesin (Robitussin) 100 MG/5 ML oral liquid 200 mg, 200 mg, Oral, Q4H PRN    sodium chloride (Saline Mist) 0.65 % nasal solution 1 spray, 1 spray, Each Nare, Q3H PRN    glycerin-hypromellose- (Artifical Tears) 0.2-0.2-1 % ophthalmic solution 1 drop, 1 drop, Both Eyes, QID PRN    piperacillin-tazobactam (Zosyn) 3.375 g  in dextrose 5% 100 mL IVPB-ADDV, 3.375 g, Intravenous, Q8H    Physical Exam:  /80 (BP Location: Left arm)   Pulse 64   Temp 98.5 °F (36.9 °C) (Oral)   Resp 18   Ht 5' 8\" (1.727 m)   Wt 152 lb (68.9 kg)   SpO2 98%   BMI 23.11 kg/m²   Temp (24hrs), Av.8 °F (36.6 °C), Min:97.4 °F (36.3 °C), Max:98.5 °F (36.9 °C)       Intake/Output Summary (Last 24 hours) at 2024 1109  Last data filed at 2024 0518  Gross per 24 hour   Intake 3241 ml   Output 3600 ml   Net -359 ml     Wt Readings from Last 3 Encounters:   24 152 lb (68.9 kg)   23 162 lb 4 oz (73.6 kg)     General: awake alert  HEENT: No scleral icterus, MMM  Neck: Supple, no MIKE or thyromegaly  Cardiac: Regular rate and rhythm, S1, S2 normal, no murmur, rub, or gallop  Lungs: Decreased breath sounds at the bases bilaterally.   Abdomen: Soft, non-tender. + bowel sounds, no palpable organomegaly  Extremities: Without clubbing, cyanosis; no edema  Neurologic: Cranial nerves grossly intact, moving all extremities  Skin: Warm and dry, no rashes.    Recent Labs     24  1244 24  0810 24  0648 24  0644   WBC 5.0 3.9* 3.8* 4.4   HGB 9.7* 9.1* 9.0* 9.7*   MCV 88.9 86.1 88.1 90.8   .0 214.0 193.0 219.0       Recent Labs     24  1244 24  0810 24  0648 24  0644   * 125* 128* 136   K 5.0 4.3 4.2 4.3   CL 86* 91* 95* 103   CO2 30.0 27.0 28.0 32.0   BUN 15 10 9 11   CREATSERUM 0.82 0.74 0.66* 1.00   CA 9.0 8.7 8.8 9.1   MG  --  1.9  --   --    PHOS  --  3.3  --   --        Recent Labs     24  1244 24  0810   AST 18 7*   ALB 3.6 3.4   ;      Assessment / Plan:    1.Hyponatremia- acute on chronic; in part dilutional due to TF (4-5 cans/day) + FWF (> 1 L daily) + drinking / sipping water continuously; 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  Meds benign without SSRI / thiazides. Denies GI losses. Euvolemic.   Improved w/ addition of  salt tabs/saline + dose of tolvaptan 1/22      2. Squamous cell CA of neck- s/p cisplatin / 5-FU / taxotere + RT -> neck dissection 12/23    3. L temporal meningioma    4. RA on methotrexate    5.  Anemia    DC planning; follow up in clinic in 1-2 wk    Nilo Abbott  1/23/2024        18

## 2024-01-23 NOTE — PROGRESS NOTES
NURSING DISCHARGE NOTE    Discharged Home via Ambulatory.  Accompanied by Family member  Belongings Taken by patient/family.    Pt discharged home via car w/ family member at approx. 1400. AVS discussed w/ pt. All belongings sent w/ pt.

## 2024-01-23 NOTE — PLAN OF CARE
Pt A/O x4. VSS. Afebrile. Pt denied pain throughout day. Medications admin per MAR. Pt's TF bolus' admin per order. Pt tolerated well. Pt ambulated safely in room. Pt had concern regarding methotrexate medication, MD notified of concern. Pharmacy consulted for medication order. Per MD, to hold medication and contact pt's pharmacy. Pt aware. Pharmacy closed tonight, to be addressed tomorrow. [facility] aware. Fall and safety precautions in place. Call light within reach.

## 2024-01-23 NOTE — PAYOR COMM NOTE
--------------  ADMISSION REVIEW     Payor: [employer]  Subscriber #:  [id number]  Authorization Number: [id number]    Admit date: 1/20/24  Admit time:  4:21 PM       History   HPI  63 year old male with PMH of squamous cell cancer of base of tongue with lymph node involvement 5/2023 and s/p chemo/radiation, TRIP, GERD, HTN   While the patient has had some level of chronic cough since his chemotherapy, this past 7 to 10 days he feels this is worsened.  He has a constant globulus sensation to his throat that he feels is triggering the cough.  He denies any fever or chills.  No headache or malaise.  No chest pain or shortness of breath.  ED Triage Vitals [01/20/24 1205]   /85   Pulse 57   Resp 18   Temp 98.1 °F (36.7 °C)   Temp src Oral   SpO2 100 %   O2 Device None (Room air)     Gen: Well appearing, well groomed, alert and aware x 3  Neck: Supple, full range of motion, no thyromegaly or lymphadenopathy.  Eye examination: EOMs are intact, normal conjunctival  ENT: No injection noted to the bilateral auditory canals; no loss of landmarks. Normal nasal mucosa without audible nasal congestion.  Oropharynx is patent without evidence of erythema, exudates or deviation.  No stridor to auscultation  Lung: No distress, RR, no retraction, breath sounds are clear bilaterally  Cardio: Regular rate and rhythm, normal S1-S2, no murmur appreciable  Skin: No sign of trauma, Skin warm and dry, no induration or sign of infection.  No rash noted    Labs Reviewed   COMP METABOLIC PANEL (14) - Abnormal; Notable for the following components:       Result Value    Sodium 120 (*)     Chloride 86 (*)     Calculated Osmolality 251 (*)    CBC W/ DIFFERENTIAL - Abnormal; Notable for the following components:    RBC 3.23 (*)     HGB 9.7 (*)     HCT 28.7 (*)     Lymphocyte Absolute 0.51 (*)    CT SOFT TISSUE OF NECK(CONTRAST ONLY) (CPT=70491)  Result Date: 1/20/2024  CONCLUSION:  1. Given the history of head and neck  cancer, there is no discrete mass to reflect primary neoplasm. 2. There is an elongated fluid collection in the left neck.  It is adjacent to surgical clips.  A postoperative seroma is a primary consideration.  The potential for infection should be correlated with clinical suspicion.  There are no additional imaging  findings suggesting an infected collection. 3. There is partial imaging of an extra-axial mass in the left temporal region.  This is most likely a meningioma.  A follow-up outpatient elective MRI with contrast is recommended for complete assessment. 4. Scattered nonenlarged lymph nodes in the visualized mediastinum. 5. There is diffuse stranding in the neck, left greater than right.  This may be related to the treatment as there is a given history of radiation. 6. Details as above.  Continued clinical correlation recommended.    LOCATION:  [facility]    Dictated by (CST): Ramiro Urrutia MD on 1/20/2024 at 2:12 PM     Finalized by (CST): Ramiro Urrutia MD on 1/20/2024 at 2:20 PM       XR CHEST PA + LAT CHEST (CPT=71046)  Result Date: 1/20/2024  CONCLUSION:  Peribronchial thickening with mild hyperinflation could be related to bronchitis and/or asthma. Clinical correlation recommended. Minimal atelectasis/scarring in the lower lungs.   LOCATION:  [facility]   Dictated by (CST): Jai Bo MD on 1/20/2024 at 12:50 PM     Finalized by (CST): Jai Bo MD on 1/20/2024 at 12:51 PM       XR SOFT TISSUE NECK (CPT=70360)  Result Date: 1/20/2024  CONCLUSION:  There is epiglottic thickening and subglottic narrowing with abnormal soft tissue opacification of the region of the larynx and hypopharynx.  Adenoid soft tissue thickening and thickening of the soft palate are also noted. The overall findings could be due to the patient's known history of head neck cancer, with underlying infection or other etiologies not entirely excluded.  CT of the soft tissues of the neck with contrast is suggested for further evaluation.     LOCATION:  [facility]    Dictated by (CST): Jai Bo MD on 1/20/2024 at 12:48 PM     Finalized by (CST): Jai Bo MD on 1/20/2024 at 12:50 PM        Disposition and Plan   Clinical Impression:  1. Hyponatremia    2. Abnormal x-ray of neck       Disposition:  Admit  1/20/2024  2:56 pm          History and Physical   History of Present Illness:   Jose Angel Calderon III is a 63 year old male with PMH squamous cell carcinoma of base of tongue s/p chemo/xrt, TRIP, GERD, HTN who p/w sob/cough. Has had chronic cough since chemo started. Completed chemo/xrt in November. Had surgery in December to remove mass and 9 lymph nodes (1 of which was cancer positive). Past week, has worsened. Now w/ SOB. Has a feeling of something in the back of his throat causing the cough. Denies f/c, cp, abd pain, n/v. Drinks about 2.5 bottles of water a day (1500cc) and does [employer] tube feed boluses 3x daily flushed w/ 250cc of free water. Has a hx of hyponatremia.      /88 (BP Location: Left arm)   Pulse 69   Temp 98.1 °F (36.7 °C)   Resp 18   Ht 5' 8\" (1.727 m)   Wt 154 lb 1.6 oz (69.9 kg)   SpO2 98%   BMI 23.43 kg/m²   General:  Alert  Respiratory: No rhonchi, no wheezes  Cardiovascular: S1, S2. Regular rate and rhythm  Abdomen: Soft, Non-tender, non-distended, positive bowel sounds  Neuro: No new focal deficits  Extremities: No edema  Lab 01/20/24  1244   RBC 3.23*   HGB 9.7*   HCT 28.7*   MCV 88.9   MCH 30.0   MCHC 33.8   RDW 13.3   NEPRELIM 3.66   WBC 5.0   .0     Lab 01/20/24  1244   GLU 96   BUN 15   CREATSERUM 0.82   EGFRCR 99   CA 9.0   ALB 3.6   *   K 5.0   CL 86*   CO2 30.0   ALKPHO 73   AST 18   BILT 0.4   TP 6.9     Assessment & Plan:    #post op seroma/fluid collection  #epiglottitis  -possible infection? Seems less likely   -ENT consult  -empiric IV abx, cxs  -IVF    #squamous cell carcinoma of base of tongue  -onc  -just completed chemo/xrt. S/p recent surgical resection of  mass    #hyponatremia, suspect 2/2 excess free water intake  -IVF  -nephro consult  -urine studies  -fluid restriction    #dietary  -consult nutrition for tube feeds    #incidental meningioma  -outpt MRI      1/21/24  Temp:  [97.7 °F (36.5 °C)-98.2 °F (36.8 °C)] 98.2 °F (36.8 °C)  Pulse:  [59-69] 59  Resp:  [12-19] 14  BP: (120-160)/(67-96) 120/79  SpO2:  [95 %-100 %] 96 %  Lab 01/20/24  1244 01/21/24  0810   WBC 5.0 3.9*   HGB 9.7* 9.1*   MCV 88.9 86.1   .0 214.0     Lab 01/20/24  1244 01/21/24  0810   GLU 96 92   BUN 15 10   CREATSERUM 0.82 0.74   CA 9.0 8.7   ALB 3.6 3.4   * 125*   K 5.0 4.3   CL 86* 91*   CO2 30.0 27.0   ALKPHO 73 61   AST 18 7*   BILT 0.4 0.4   TP 6.9 6.1*    Medications:    iron sucrose  200 mg Intravenous Daily    sodium chloride  1 g Oral TID CC    amLODIPine  10 mg Oral Daily    atenolol  50 mg Oral Daily    folic acid  1 mg Oral Daily    pantoprazole  20 mg Oral BID AC    enoxaparin  40 mg Subcutaneous Daily    piperacillin-tazobactam  3.375 g Intravenous Q8H         Assessment & Plan:       #post op seroma/fluid collection  #epiglottitis  -d/w ENT in detail.   -suspect post op seroma. Doubt infection as no white count, fever, etc. Check procal. If negative and bcxs negative will stop empiric abx.   -has open airway. No significant swelling. Could consider steroids but will hold for now.  -resume diet. If tolerates well, will have pt f/u w/ his normal surgeon.      #squamous cell carcinoma of base of tongue  -onc  -just completed chemo/xrt. S/p recent surgical resection of mass     #hyponatremia, suspect 2/2 SIADH/excess free water intake  -IVF  -nephro consult  -urine studies  -fluid restriction  -Na tabs  -improving     #dietary  -consult nutrition for tube feeds     #incidental meningioma  -outpt MRI      1/22/24  Temp:  [97.7 °F (36.5 °C)-98.2 °F (36.8 °C)] 98.1 °F (36.7 °C)  Pulse:  [59-68] 62  Resp:  [14-18] 18  BP: (120-151)/(72-83) 151/83  SpO2:  [95 %-100 %] 97 %      Physical Exam:    ENT: open airway, bandages, no significant swelling  Respiratory: No wheezes, no rhonchi  Cardiovascular: S1, S2, regular rate and rhythm  Abdomen: Soft, Non-tender, non-distended, positive bowel sounds  Neuro: No new focal deficits.   Extremities: No edema  Lab 01/20/24  1244 01/21/24  0810 01/22/24  0648   WBC 5.0 3.9* 3.8*   HGB 9.7* 9.1* 9.0*   MCV 88.9 86.1 88.1   .0 214.0 193.0      Lab 01/20/24  1244 01/21/24  0810 01/22/24  0648   GLU 96 92 95   BUN 15 10 9   CREATSERUM 0.82 0.74 0.66*   CA 9.0 8.7 8.8   ALB 3.6 3.4  --    * 125* 128*   K 5.0 4.3 4.2   CL 86* 91* 95*   CO2 30.0 27.0 28.0   ALKPHO 73 61  --    AST 18 7*  --    BILT 0.4 0.4  --    TP 6.9 6.1*  --    Assessment & Plan:       #post op seroma/fluid collection  #epiglottitis  -d/w ENT in detail.   -suspect post op seroma. Doubt infection as no white count, fever, etc. Check procal. If negative and bcxs negative will stop empiric abx.   -has open airway. No significant swelling. Could consider steroids but will hold for now.  -will have pt f/u w/ his normal surgeon     #squamous cell carcinoma of base of tongue  -onc  -just completed chemo/xrt. S/p recent surgical resection of mass     #hyponatremia, suspect 2/2 SIADH/excess free water intake  -IVF  -nephro consult  -fluid restriction  -Na tabs  -improving but still <130  -tolvaptan today     #dietary  -consult nutrition for tube feeds     #incidental meningioma  -outpt MRI         MEDICATIONS ADMINISTERED IN LAST 1 DAY:  amLODIPine (Norvasc) tab 10 mg       Date Action Dose Route User    1/22/2024 0843 Given 10 mg Oral Justus Reilly, RN          atenolol (Tenormin) tab 50 mg       Date Action Dose Route User    1/22/2024 0846 Given 50 mg Oral Justus Reilly, RN          enoxaparin (Lovenox) 40 MG/0.4ML SUBQ injection 40 mg       Date Action Dose Route User    1/22/2024 1846 Given 40 mg Subcutaneous (Right Upper Arm) Justus Reilly, RN          folic acid  (Folvite) tab 1 mg       Date Action Dose Route User    1/22/2024 0846 Given 1 mg Oral Justus Reilly RN          iron sucrose (Venofer) 20 mg/mL injection 200 mg       Date Action Dose Route User    1/22/2024 0846 New Bag 200 mg Intravenous Justus Reilly RN          pantoprazole (Protonix) DR tab 20 mg       Date Action Dose Route User    1/23/2024 0516 Given 20 mg Oral Gilma Yoder RN    1/22/2024 1846 Given 20 mg Oral Justus Reilly RN          piperacillin-tazobactam (Zosyn) 3.375 g in dextrose 5% 100 mL IVPB-ADDV       Date Action Dose Route User    1/23/2024 0132 New Bag 3.375 g Intravenous Gilma Yoder RN    1/22/2024 1846 New Bag 3.375 g Intravenous Justus Reilly RN    1/22/2024 0901 New Bag 3.375 g Intravenous Justus Reilly RN          sodium chloride 0.9% infusion       Date Action Dose Route User    1/22/2024 1144 Rate/Dose Change (none) Intravenous Justus Reilly RN          sodium chloride tab 1 g       Date Action Dose Route User    1/22/2024 1846 Given 1 g Oral Justus Reilly RN    1/22/2024 1333 Given 1 g Oral Radha David RN    1/22/2024 0900 Given 1 g Oral Justus Reilly RN          tolvaptan (Samsca) tab 15 mg       Date Action Dose Route User    1/22/2024 1333 Given 15 mg Oral Radha David RN            Vitals (last day)       Date/Time Temp Pulse Resp BP SpO2 Weight O2 Device O2 Flow Rate (L/min) [facility] [place]    01/23/24 0518 97.6 °F (36.4 °C) 68 16 143/76 97 % -- None (Room air) -- [place]    01/23/24 0000 97.4 °F (36.3 °C) 72 18 123/88 97 % -- None (Room air) -- [place]    01/22/24 2000 97.6 °F (36.4 °C) 68 20 130/80 96 % -- None (Room air) -- [place]    01/22/24 1556 97.8 °F (36.6 °C) 64 20 132/72 99 % -- None (Room air) -- [place]    01/22/24 0330 97.9 °F (36.6 °C) 67 18 137/83 98 % -- None (Room air) -- [place]

## 2024-01-24 ENCOUNTER — PATIENT OUTREACH (OUTPATIENT)
Dept: CASE MANAGEMENT | Age: 64
End: 2024-01-24

## 2024-01-24 NOTE — PAYOR COMM NOTE
--------------  DISCHARGE REVIEW    Payor: [facility] OUT OF STATE PPO  Subscriber #:  [id number]  Authorization Number: [id number]    Admit date: 24  Admit time:   4:21 PM  Discharge Date: 2024  2:15 PM     Admitting Physician: Babar Escobar MD  Attending Physician:  No att. providers found  Primary Care Physician: Morgan Woods          Discharge Summary Notes        Discharge Summary signed by Babar Escobar MD at 2024  5:18 PM       Author: Babar Escobar MD Specialty: HOSPITALIST, Internal Medicine Author Type: Physician    Filed: 2024  5:18 PM Date of Service: 2024  5:14 PM Status: Signed    : Babar Escobar MD (Physician)           [facility]  DISCHARGE SUMMARY     Jose Angel Calderon III Patient Status:  Inpatient    3/27/1960 MRN [id number]   Location [facility] 4NW-A Attending No att. providers found   Hosp Day # 3 PCP No primary care provider on file.     Date of Admission: 2024  Date of Discharge:  2024     Discharge Disposition: Home or Self Care    Discharge Diagnosis:  #post op seroma/fluid collection  #epiglottitis  -d/w ENT in detail.   -suspect post op seroma. Doubt infection as no white count, fever, etc. Check procal. If negative and bcxs negative will stop empiric abx.   -has open airway. No significant swelling. Could consider steroids but will hold for now.  -will have pt f/u w/ his normal surgeon     #squamous cell carcinoma of base of tongue  -onc  -just completed chemo/xrt. S/p recent surgical resection of mass     #hyponatremia, suspect 2/2 SIADH/excess free water intake  -IVF  -nephro consult  -fluid restriction  -Na tabs  -improved  -tolvaptan      #dietary  -consult nutrition for tube feeds     #incidental meningioma  -outpt MRI  -d/w pt in detail     History of Present Illness: Jose Angel Calderon III is a 63 year old male with PMH squamous cell carcinoma of base of tongue s/p chemo/xrt, TRIP, GERD, HTN who p/w  sob/cough. Has had chronic cough since chemo started. Completed chemo/xrt in November. Had surgery in December to remove mass and 9 lymph nodes (1 of which was cancer positive). Past week, has worsened. Now w/ SOB. Has a feeling of something in the back of his throat causing the cough. Denies f/c, cp, abd pain, n/v. Drinks about 2.5 bottles of water a day (1500cc) and does [employer] tube feed boluses 3x daily flushed w/ 250cc of free water. Has a hx of hyponatremia.      Brief Synopsis: pt w/ post op seroma in neck. Reviewed w/ ENT, no signs of infection. Has open airway. Can hold off on steroids and dc to f/u w/ his normal surgeon to review. Also noted to have hyponatremia/SIADH. May be paraneoplastic although cancer was removed. May be 2/2 excess free water intake and poor solute intake. Started on salt tabs. Corrected w/ tolvaptan. Ok to DC home.     Lace+ Score: 58  59-90 High Risk  29-58 Medium Risk  0-28   Low Risk       TCM Follow-Up Recommendation:  LACE 29-58: Moderate Risk of readmission after discharge from the hospital.      Procedures during hospitalization:   none    Incidental or significant findings and recommendations (brief descriptions):  none    Lab/Test results pending at Discharge:   none    Consultants:  ENT, nephro    Discharge Medication List:     Discharge Medications        START taking these medications        Instructions Prescription details   sodium chloride 1 g Tabs      Take 1 tablet (1 g total) by mouth 3 (three) times daily with meals.   Quantity: 90 tablet  Refills: 0            CONTINUE taking these medications        Instructions Prescription details   amLODIPine 10 MG Tabs  Commonly known as: Norvasc      Take 1 tablet (10 mg total) by mouth daily.   Refills: 0     atenolol 50 MG Tabs  Commonly known as: Tenormin      Take 1 tablet (50 mg total) by mouth daily.   Refills: 0     folic acid 1 MG Tabs  Commonly known as: Folvite      Take by mouth daily.   Refills: 0      methotrexate 2.5 MG Tabs  Commonly known as: Rheumatrex      Take 6 tablets (15 mg total) by mouth Every Monday.   Refills: 0     omeprazole 20 MG Cpdr  Commonly known as: PriLOSEC      Take 1 capsule (20 mg total) by mouth in the morning and 1 capsule (20 mg total) before bedtime.   Refills: 0     ondansetron 4 MG Tbdp  Commonly known as: Zofran-ODT      Take 1 tablet (4 mg total) by mouth every 8 (eight) hours as needed for Nausea.   Refills: 0               Where to Get Your Medications        These medications were sent to [id number] - [place], [place] - [address] [facility] [phone], [phone]  [address] [facility] [facility] [address]      Phone: [phone]   sodium chloride 1 g Tabs         [place] reviewed[place]    Follow-up appointment:   Ezra Monk MD  [address] [place] [address]  [address] [address] [phone]    Follow up in 1 week(s)      PCP    Schedule an appointment as soon as possible for a visit      Appointments for Next 30 Days 2024 - 2024        Date Arrival Time Visit Type Length Department Provider     2024  4:00 PM  HOSPITAL FOLLOW UP [2830] 20 min [facility] Nephrology Adriel Mendoza MD    Patient Instructions:         Location Instructions:     Masks are optional for all patients and visitors, unless otherwise indicated.                      Vital signs:  Temp:  [97.4 °F (36.3 °C)-98.7 °F (37.1 °C)] 98.7 °F (37.1 °C)  Pulse:  [64-72] 65  Resp:  [16-20] 18  BP: (123-147)/(76-88) 140/83  SpO2:  [96 %-100 %] 100 %    Physical Exam:    General: No acute distress   Lungs: clear to auscultation  Cardiovascular: S1, S2  Abdomen: Soft      -----------------------------------------------------------------------------------------------  PATIENT DISCHARGE INSTRUCTIONS: See electronic chart    Babar Escobar MD    Total time spent on discharge plannin minutes     The  Cures Act makes medical notes like these available to patients in the interest  of transparency. Please be advised this is a medical document. Medical documents are intended to carry relevant information, facts as evident, and the clinical opinion of the practitioner. The medical note is intended as peer to peer communication and may appear blunt or direct. It is written in medical language and may contain abbreviations or verbiage that are unfamiliar.     Electronically signed by Babar Escobar MD on 1/23/2024  5:18 PM         REVIEWER COMMENTS

## 2024-01-24 NOTE — PROGRESS NOTES
TCM chart review.  No TCM as patient follows with outside [facility] PCP.  Encounter closing.

## 2024-01-31 ENCOUNTER — TELEPHONE (OUTPATIENT)
Dept: NEPHROLOGY | Facility: CLINIC | Age: 64
End: 2024-01-31

## 2024-01-31 DIAGNOSIS — E87.1 HYPONATREMIA: Primary | ICD-10-CM

## 2024-02-26 ENCOUNTER — OFFICE VISIT (OUTPATIENT)
Dept: NEPHROLOGY | Facility: CLINIC | Age: 64
End: 2024-02-26
Payer: COMMERCIAL

## 2024-02-26 VITALS — DIASTOLIC BLOOD PRESSURE: 60 MMHG | SYSTOLIC BLOOD PRESSURE: 116 MMHG | BODY MASS INDEX: 23 KG/M2 | WEIGHT: 154 LBS

## 2024-02-26 DIAGNOSIS — E87.1 HYPONATREMIA: Primary | ICD-10-CM

## 2024-02-26 RX ORDER — FUROSEMIDE 20 MG/1
10 TABLET ORAL DAILY
COMMUNITY
Start: 2024-02-19 | End: 2024-03-20

## 2024-02-26 NOTE — PROGRESS NOTES
Nephrology Progress Note      Jose Angel Calderon III is a 63 year old male.    HPI:     Chief Complaint   Patient presents with    Hospital F/U     Hyponatremia       Mr. Calderon was seen in the nephrology clinic today in follow-up for management of hyponatremia during her recent hospitalization.  He had presented to the hospital for evaluation of cough and at that time was found to have a sodium of 120 mill equivalents per liter on January 21.  This was thought to be related to increase fluid intake given ongoing issues with dry mouth related to his cancer and also thought to be a potential component of SIADH paraneoplastic in nature given his head and neck cancer.  Sodium at the time of discharge from the hospital on January 23 was 136 mill equivalents per liter.  Follow-up sodium on January 30 was 129, on February 15 124 and on February 19 126 milliequivalents per liter.  I was contacted by an emergency room physician and recommended starting the patient on furosemide at 10 mg daily which he has done.  Repeat labs will be drawn tomorrow      HISTORY:  Past Medical History:   Diagnosis Date    Esophageal reflux     Essential hypertension     Throat cancer (HCC)       Past Surgical History:   Procedure Laterality Date    FOOT JOINT ARTHORCENTESIS Right     REPAIR ING HERNIA,5+Y/O,REDUCIBL      THROAT SURGERY PROCEDURE UNLISTED        History reviewed. No pertinent family history.   Social History:   Social History     Socioeconomic History    Marital status:    Tobacco Use    Smoking status: Former     Types: Cigarettes    Smokeless tobacco: Never   Vaping Use    Vaping Use: Never used   Substance and Sexual Activity    Alcohol use: Not Currently    Drug use: Never     Social Determinants of Health     Food Insecurity: No Food Insecurity (1/20/2024)    Food Insecurity     Food Insecurity: Never true   Transportation Needs: No Transportation Needs (1/20/2024)    Transportation Needs     Lack of Transportation:  No   Housing Stability: Low Risk  (1/20/2024)    Housing Stability     Housing Instability: No        Medications (Active prior to today's visit):  Current Outpatient Medications   Medication Sig Dispense Refill    furosemide 20 MG Oral Tab Take 0.5 tablets (10 mg total) by mouth daily.      sodium chloride 1 g Oral Tab Take 1 tablet (1 g total) by mouth 3 (three) times daily with meals. 90 tablet 0    methotrexate 2.5 MG Oral Tab Take 6 tablets (15 mg total) by mouth Every Monday.      folic acid 1 MG Oral Tab Take by mouth daily.      ondansetron 4 MG Oral Tablet Dispersible Take 1 tablet (4 mg total) by mouth every 8 (eight) hours as needed for Nausea.      amLODIPine 10 MG Oral Tab Take 1 tablet (10 mg total) by mouth daily.      atenolol 50 MG Oral Tab Take 1 tablet (50 mg total) by mouth daily.      omeprazole 20 MG Oral Capsule Delayed Release Take 1 capsule (20 mg total) by mouth in the morning and 1 capsule (20 mg total) before bedtime.         Allergies:  No Known Allergies      ROS:     Denies fever/chills  Denies wt loss/gain  Denies HA or visual changes  Denies CP or palpitations  Denies SOB/cough/hemoptysis  Denies abd or flank pain  Denies N/V/D  Denies change in urinary habits or gross hematuria  Denies LE edema  Denies skin rashes/myalgias/arthralgias      PHYSICAL EXAM:   There were no vitals taken for this visit.  Wt Readings from Last 6 Encounters:   01/22/24 152 lb (68.9 kg)   11/13/23 162 lb 4 oz (73.6 kg)       General: Alert and oriented in no apparent distress.  HEENT: No scleral icterus, MMM  Neck: Supple, edema noted  Cardiac: Regular rate and rhythm, S1, S2 normal, no murmur or rub  Lungs: Clear without wheezes, rales, rhonchi.    Extremities: Without clubbing, cyanosis or edema.  Neurologic: Alert and oriented, normal affect, cranial nerves grossly intact, moving all extremities  Skin: Warm and dry, no rashes      LABS:     Lab Results   Component Value Date    BUN 11 01/23/2024     CREATSERUM 1.00 01/23/2024    ANIONGAP 1 01/23/2024    CA 9.1 01/23/2024    OSMOCALC 282 01/23/2024    ALKPHO 61 01/21/2024    AST 7 (L) 01/21/2024    ALT  01/21/2024      Comment:      Due to  backorder we are temporarily unable to offer hospital-based ALT testing at [facility].   If urgently needed, please order ALT test code [id number].   The new order will need a new venipuncture and will be sent to [facility] for testing.   The expected turnaround time will be within 24 hours.     BILT 0.4 01/21/2024    TP 6.1 (L) 01/21/2024    ALB 3.4 01/21/2024    GLOBULIN 2.7 (L) 01/21/2024     01/23/2024    K 4.3 01/23/2024     01/23/2024    CO2 32.0 01/23/2024     Lab Results   Component Value Date    RBC 3.27 (L) 01/23/2024    HGB 9.7 (L) 01/23/2024    HCT 29.7 (L) 01/23/2024    .0 01/23/2024    MCV 90.8 01/23/2024    MCH 29.7 01/23/2024    MCHC 32.7 01/23/2024    RDW 13.7 01/23/2024    NEPRELIM 2.92 01/23/2024    NEPERCENT 66.4 01/23/2024    LYPERCENT 10.9 01/23/2024    MOPERCENT 12.7 01/23/2024    EOPERCENT 8.6 01/23/2024    BAPERCENT 0.9 01/23/2024    NE 2.92 01/23/2024    LYMABS 0.48 (L) 01/23/2024    MOABSO 0.56 01/23/2024    EOABSO 0.38 01/23/2024    BAABSO 0.04 01/23/2024     No results found for: \"CREUR\", \"CREAURINE\", \"MIALBURINE\", \"MCRRATIOUR\", \"MALBCRECALC\", \"MICROALBUMIN\", \"CREAUR\", \"MALBCREACALC\"  Lab Results   Component Value Date    CLARITY Clear 01/20/2024    SPECGRAVITY 1.015 01/20/2024    GLUUR Negative 01/20/2024    BILUR Negative 01/20/2024    KETUR Negative 01/20/2024    BLOODURINE Negative 01/20/2024    PHURINE 7.0 01/20/2024    PROUR Negative 01/20/2024    UROBILINOGEN 0.2 01/20/2024    NITRITE Negative 01/20/2024    LEUUR Negative 01/20/2024    NMIC Microscopic not indicated 01/20/2024     ASSESSMENT/PLAN:     #1.  Hyponatremia-this is likely multifactorial in origin.  He had presented with a sodium of 120 mill equivalents per liter and at that time the urine osmolality  was 236.  There is certainly likely a component of underlying SIADH related to his head and neck cancer.  In addition he had been drinking large volumes of water primarily due to dry mouth and also had been supplementing his tube feedings with extra water as well.  He has curtailed his fluid intake and has increased his salt intake and also is taking sodium tablets.  Furosemide was added in an effort to impair his urinary concentrating ability and allow him to excrete a more dilute urine.  Will see his sodium level tomorrow    The above was discussed at length with the patient and his wife in the office today.      Adriel Mendoza MD  2/26/2024  3:58 PM

## 2024-03-29 ENCOUNTER — LAB ENCOUNTER (OUTPATIENT)
Dept: LAB | Age: 64
End: 2024-03-29
Attending: INTERNAL MEDICINE
Payer: COMMERCIAL

## 2024-03-29 DIAGNOSIS — E87.1 HYPONATREMIA: ICD-10-CM

## 2024-03-29 LAB
ANION GAP SERPL CALC-SCNC: 1 MMOL/L (ref 0–18)
BUN BLD-MCNC: 21 MG/DL (ref 9–23)
CALCIUM BLD-MCNC: 9.5 MG/DL (ref 8.5–10.1)
CHLORIDE SERPL-SCNC: 100 MMOL/L (ref 98–112)
CO2 SERPL-SCNC: 33 MMOL/L (ref 21–32)
CREAT BLD-MCNC: 1.2 MG/DL
EGFRCR SERPLBLD CKD-EPI 2021: 68 ML/MIN/1.73M2 (ref 60–?)
FASTING STATUS PATIENT QL REPORTED: YES
GLUCOSE BLD-MCNC: 87 MG/DL (ref 70–99)
OSMOLALITY SERPL CALC.SUM OF ELEC: 280 MOSM/KG (ref 275–295)
POTASSIUM SERPL-SCNC: 4.4 MMOL/L (ref 3.5–5.1)
SODIUM SERPL-SCNC: 134 MMOL/L (ref 136–145)

## 2024-03-29 PROCEDURE — 80048 BASIC METABOLIC PNL TOTAL CA: CPT | Performed by: INTERNAL MEDICINE

## 2024-04-11 ENCOUNTER — TELEPHONE (OUTPATIENT)
Dept: NEPHROLOGY | Facility: CLINIC | Age: 64
End: 2024-04-11

## 2024-04-11 NOTE — TELEPHONE ENCOUNTER
Medication requested: sodium chloride   Dose: 1 g Oral Tab     Is patient requesting 30 or 90 day supply:  90    Pharmacy name/location:  [id number] - [place], [place] - [address] [facility] [phone], [phone]     LOV:  02/26/24    Is the patient due for appointment: no (if so, please schedule)    Additional notes:        Medication requested: furosemide   Dose: 20 MG Oral Tab     Is patient requesting 30 or 90 day supply:  90    Pharmacy name/location:  [id number] - [place], [place] - [address] [facility] [phone], [phone]     LOV:  02/26/24    Is the patient due for appointment: no (if so, please schedule)    Additional notes:  Prescribed by another kidney doctor in [place], [place]

## 2024-05-20 RX ORDER — SODIUM CHLORIDE 1 G/1
1 TABLET ORAL
Qty: 90 TABLET | Refills: 0 | Status: SHIPPED | OUTPATIENT
Start: 2024-05-20

## 2024-08-22 RX ORDER — SODIUM CHLORIDE 1 G/1
1 TABLET ORAL
Qty: 90 TABLET | Refills: 2 | Status: SHIPPED | OUTPATIENT
Start: 2024-08-22

## 2024-09-22 ENCOUNTER — OFFICE VISIT (OUTPATIENT)
Dept: FAMILY MEDICINE CLINIC | Facility: CLINIC | Age: 64
End: 2024-09-22
Payer: COMMERCIAL

## 2024-09-22 VITALS
HEART RATE: 87 BPM | OXYGEN SATURATION: 96 % | WEIGHT: 153 LBS | RESPIRATION RATE: 18 BRPM | SYSTOLIC BLOOD PRESSURE: 110 MMHG | TEMPERATURE: 98 F | DIASTOLIC BLOOD PRESSURE: 80 MMHG | BODY MASS INDEX: 23 KG/M2

## 2024-09-22 DIAGNOSIS — U07.1 POSITIVE SELF-ADMINISTERED ANTIGEN TEST FOR COVID-19: Primary | ICD-10-CM

## 2024-09-22 PROCEDURE — 99213 OFFICE O/P EST LOW 20 MIN: CPT | Performed by: NURSE PRACTITIONER

## 2024-09-22 PROCEDURE — 3074F SYST BP LT 130 MM HG: CPT | Performed by: NURSE PRACTITIONER

## 2024-09-22 PROCEDURE — 3079F DIAST BP 80-89 MM HG: CPT | Performed by: NURSE PRACTITIONER

## 2024-09-22 NOTE — PROGRESS NOTES
CHIEF COMPLAINT:     Chief Complaint   Patient presents with    Covid     Home covid test positive, wife has covid as well.  S/s for 2 days       HPI:   Jose Angel Calderon III is a 64 year old male who presents with complaints of fatigue.  Patient returned from [place] 2 days ago.  His wife started to feel ill.  He has no complaints other than fatigue.  Tested + Covid this morning.  Denies cough, congestion, fever, body aches.  Patient is in remission for cancer, but takes methotrexate weekly on Wednesdays.      Current Outpatient Medications   Medication Sig Dispense Refill    nirmatrelvir-ritonavir 300-100 MG Oral Tablet Therapy Pack Take two nirmatrelvir tablets (300mg) with one ritonavir tablet (100mg) together twice daily for 5 days. 30 tablet 0    sodium chloride 1 g Oral Tab Take 1 tablet (1 g total) by mouth 3 (three) times daily with meals. 90 tablet 2    methotrexate 2.5 MG Oral Tab Take 6 tablets (15 mg total) by mouth Every Monday.      folic acid 1 MG Oral Tab Take by mouth daily.      ondansetron 4 MG Oral Tablet Dispersible Take 1 tablet (4 mg total) by mouth every 8 (eight) hours as needed for Nausea.      amLODIPine 10 MG Oral Tab Take 1 tablet (10 mg total) by mouth daily.      atenolol 50 MG Oral Tab Take 1 tablet (50 mg total) by mouth daily.      omeprazole 20 MG Oral Capsule Delayed Release Take 1 capsule (20 mg total) by mouth in the morning and 1 capsule (20 mg total) before bedtime.        Past Medical History:    Esophageal reflux    Essential hypertension    Throat cancer (HCC)      Social History:  Social History     Socioeconomic History    Marital status:    Tobacco Use    Smoking status: Former     Types: Cigarettes    Smokeless tobacco: Never   Vaping Use    Vaping status: Never Used   Substance and Sexual Activity    Alcohol use: Not Currently    Drug use: Never     Social Determinants of Health     Financial Resource Strain: High Risk (12/12/2023)    Received from [facility],  [facility]    Overall Financial Resource Strain (CARDIA)     Difficulty of Paying Living Expenses: Hard   Food Insecurity: No Food Insecurity (1/20/2024)    Food Insecurity     Food Insecurity: Never true   Transportation Needs: No Transportation Needs (1/20/2024)    Transportation Needs     Lack of Transportation: No   Housing Stability: Low Risk  (1/20/2024)    Housing Stability     Housing Instability: No        REVIEW OF SYSTEMS:   GENERAL: Denies fever, chills,weight change, decreased appetite  SKIN: Denies rashes, skin wounds or ulcers.  EYES: Denies blurred vision or double vision  HENT: Denies congestion, rhinorrhea, sore throat or ear pain  CHEST: Denies chest pain, or palpitations  LUNGS: Denies shortness of breath, cough, or wheezing  GI: Denies abdominal pain, N/V/C/D.   MUSCULOSKELETAL: no arthralgia or swollen joints  LYMPH:  Denies lymphadenopathy  NEURO: Denies headaches or lightheadedness      EXAM:   /80   Pulse 87   Temp 98.3 °F (36.8 °C) (Oral)   Resp 18   Wt 153 lb (69.4 kg)   SpO2 96%   BMI 23.26 kg/m²   GENERAL: well developed, well nourished,in no apparent distress  SKIN: no rashes,no suspicious lesions  HEAD: atraumatic, normocephalic  EYES: conjunctiva clear, EOM intact, PERRLA  EARS: TM's without erythema, no bulging, no retraction, no fluid, bony landmarks visible  NOSE: nostrils patent, no exudates, nasal mucosa pink and noninflamed  THROAT: oral mucosa pink, moist. No visible dental caries. Posterior pharynx is not erythematous. no exudates.  NECK: supple, non-tender  LUNGS: clear to auscultation bilaterally, no wheezes or rhonchi. Breathing is non labored.  CARDIO: RRR without murmur  EXTREMITIES: no cyanosis, clubbing or edema  LYMPH:  no cervical lymphadenopathy.      ASSESSMENT AND PLAN:     ASSESSMENT:  Encounter Diagnosis   Name Primary?    Positive self-administered antigen test for COVID-19 Yes       PLAN:  Discussed Paxlovid.  Patient is interested.  Discussed  interactions with methotrexate and amlodipine.  Patient advised to start Paxlovid or wait until tomorrow, but either way contact oncologist or PCP to hold methotrexate this week.  Advised to take amlodipine every other day for next 8 days and monitor BP.  Patient agrees with plan.     There are no Patient Instructions on file for this visit.    The patient indicates understanding of these issues and agrees to the plan.

## 2024-09-23 ENCOUNTER — TELEPHONE (OUTPATIENT)
Dept: NEPHROLOGY | Facility: CLINIC | Age: 64
End: 2024-09-23

## 2024-09-23 NOTE — TELEPHONE ENCOUNTER
Called patient to notify patient is ok to take paxlovid per Dr. Mendoza. Patient verbalized understanding

## 2024-12-30 RX ORDER — SODIUM CHLORIDE 1 G/1
1 TABLET ORAL
Qty: 90 TABLET | Refills: 5 | Status: SHIPPED | OUTPATIENT
Start: 2024-12-30